# Patient Record
Sex: FEMALE | Race: WHITE | Employment: FULL TIME | URBAN - METROPOLITAN AREA
[De-identification: names, ages, dates, MRNs, and addresses within clinical notes are randomized per-mention and may not be internally consistent; named-entity substitution may affect disease eponyms.]

---

## 2020-09-21 ENCOUNTER — OFFICE VISIT (OUTPATIENT)
Dept: SURGERY | Age: 40
End: 2020-09-21

## 2020-09-21 VITALS
RESPIRATION RATE: 18 BRPM | SYSTOLIC BLOOD PRESSURE: 136 MMHG | HEART RATE: 74 BPM | HEIGHT: 61 IN | WEIGHT: 260 LBS | DIASTOLIC BLOOD PRESSURE: 80 MMHG | TEMPERATURE: 97.7 F | OXYGEN SATURATION: 96 % | BODY MASS INDEX: 49.09 KG/M2

## 2020-09-21 DIAGNOSIS — I10 ESSENTIAL HYPERTENSION: ICD-10-CM

## 2020-09-21 DIAGNOSIS — E66.01 MORBID OBESITY (HCC): Primary | ICD-10-CM

## 2020-09-21 RX ORDER — HYDROCHLOROTHIAZIDE 12.5 MG/1
CAPSULE ORAL
Status: ON HOLD | COMMUNITY
Start: 2020-08-07 | End: 2021-03-22

## 2020-09-21 RX ORDER — LANOLIN ALCOHOL/MO/W.PET/CERES
325 CREAM (GRAM) TOPICAL DAILY
COMMUNITY
Start: 2020-08-07

## 2020-09-21 RX ORDER — VITAMIN A ACETATE, BETA CAROTENE, ASCORBIC ACID, CHOLECALCIFEROL, .ALPHA.-TOCOPHEROL ACETATE, DL-, THIAMINE MONONITRATE, RIBOFLAVIN, NIACINAMIDE, PYRIDOXINE HYDROCHLORIDE, FOLIC ACID, CYANOCOBALAMIN, CALCIUM CARBONATE, FERROUS FUMARATE, ZINC OXIDE, CUPRIC OXIDE 3080; 12; 120; 400; 1; 1.84; 3; 20; 22; 920; 25; 200; 27; 10; 2 [IU]/1; UG/1; MG/1; [IU]/1; MG/1; MG/1; MG/1; MG/1; MG/1; [IU]/1; MG/1; MG/1; MG/1; MG/1; MG/1
1 TABLET, FILM COATED ORAL DAILY
COMMUNITY
End: 2021-03-23

## 2020-09-21 RX ORDER — CALCIUM CARBONATE/VITAMIN D3 600 MG-125
TABLET ORAL
COMMUNITY

## 2020-09-21 RX ORDER — LEVOTHYROXINE SODIUM 112 UG/1
125 TABLET ORAL
COMMUNITY

## 2020-09-21 NOTE — PROGRESS NOTES
Chief Complaint   Patient presents with    Morbid Obesity     confirmed seminar     Pt ID confirmed    Weight Loss Metrics 9/21/2020 9/21/2020   Pre op / Initial Wt 260 -   Today's Wt - 260 lb   BMI - 49.13 kg/m2   Ideal Body Wt 122 -   Excess Body Wt 138 -   Goal Wt 150 -   Wt loss to date 0 -   % Wt Loss 0 -   80% .4 -       Body mass index is 49.13 kg/m².

## 2020-09-21 NOTE — LETTER
9/21/20 Patient: Mary Vasquez YOB: 1980 Date of Visit: 9/21/2020 Ortiz Lovell MD 
[address] [place] [address] [address] [place] [address] VIA Facsimile: [phone] Dear Ortiz Lovell MD, Thank you for referring Ms. Mary Vasquez to [facility] [address] [place] [address] for evaluation. My notes for this consultation are attached. If you have questions, please do not hesitate to call me. I look forward to following your patient along with you.  
 
 
Sincerely, 
 
Marissa Benítez MD

## 2020-09-21 NOTE — PROGRESS NOTES
Initial Consultation for Bariatric Surgery Template (Gastric BYpass)    Miguel Modi is a 38 y.o. female who comes into the office today for initial consultation for the surgical options for the treatment of morbid obesity. The patient initially identified obesity at the age of 23 and at age 24 weighed 120 lbs. She has tried a variety of unsupervised weight-loss attempts including self imposed, registered dietician and phentermine and Keto, but has yet to meet with lasting success. Maximum weight lost on a diet is about 80 lbs, but that the weight loss always seems to return. Today, the patient is  Height: 5' 1\" (154.9 cm) tall, Weight: 117.9 kg (260 lb) lbs for a Body mass index is 49.13 kg/m². It is due to the patient's severe obesity, which is further complicated by hypertension, stress urinary incontinence and obstructive sleep apnea - clinical  that the patient is now seeking out bariatric surgery. Past Medical History:   Diagnosis Date    Diabetes ([facility] [address]     gestational    Hypertension     Thyroid disease        Past Surgical History:   Procedure Laterality Date    HX  SECTION       btl    HX HEENT      tonsillectomy    HX OTHER SURGICAL      facial reconstruction - dog bite    HX TUBAL LIGATION         Current Outpatient Medications on File Prior to Visit   Medication Sig Dispense Refill    ferrous sulfate 325 mg (65 mg iron) tablet Take 325 mg by mouth daily.  hydroCHLOROthiazide (MICROZIDE) 12.5 mg capsule       prenatal vit-calcium-iron-fa (Prenatal Plus, calcium carb,) 27 mg iron- 1 mg tab Take 1 Tab by mouth daily.  calcium-cholecalciferol, d3, (CALCIUM 600 + D) 600-125 mg-unit tab Take  by mouth.  levothyroxine (SYNTHROID) 112 mcg tablet Take  by mouth Daily (before breakfast). No current facility-administered medications on file prior to visit.         No Known Allergies    Social History     Tobacco Use    Smoking status: Former Smoker     Last attempt to quit: 2013     Years since quittin.0    Smokeless tobacco: Never Used   Substance Use Topics    Alcohol use: Yes     Frequency: Monthly or less    Drug use: Not Currently       Family History   Problem Relation Age of Onset    No Known Problems Mother     Alcohol abuse Father     Lung Disease Father     Liver Disease Father        Family Status   Relation Name Status    Mother  Alive    Father  Alive       Review of Systems:  Positive in BOLD    CONST: Fever, weight loss, fatigue or chills  GI: Nausea, vomiting, abdominal pain, change in bowel habits, hematochezia, melena, and GERD   INTEG: Dermatitis, abnormal moles  HEENT: Recent changes in visin, vertigo, epistaxis, dysphagia and hoarseness  CV: Chest pain, palpitations, HTN, edema and varicosities  RESP: Cough, shortness of breath, wheezing, hemoptysis, snoring and reactive airway disease  : Hematuria, dysuria, frequency, urgency, nocturia and stress urinary incontinence   MS: Weakness, joint pain and arthritis  ENDO: Diabetes - gestational, thyroid disease, polyuria, polydipsia, polyphagia, poor wound healing, heat intolerance, cold intolerance  LYMPH/HEME: Anemia, bruising and history of blood transfusions  NEURO: Dizziness, headache, fainting, seizures and stroke  PSYCH: Anxiety and depression      Physical Exam    Visit Vitals  /80   Pulse 74   Temp 97.7 °F (36.5 °C)   Resp 18   Ht 5' 1\" (1.549 m)   Wt 117.9 kg (260 lb)   LMP 2020   SpO2 96%   BMI 49.13 kg/m²       Pre op weight: 260  EBW: 138  Wt loss to date: 0       General: 38 y.o.) female in no acute distress. Morbidly obese in truncal and arms, thighs - mixed pattern  HEENT: Normocephalic, atraumatic, Pupils equal and reactive, nasopharynx clear, oropharynx clear and moist without lesions  NECK: Supple, no lymphadenopathy, thyromegaly, carotid bruits or jugular venous distension.  trachea midline  RESP: Clear to auscultation bilaterally, no wheezes, rhonchi, or rales, normal respiratory excursion  CV: Regular rate and rhythm, no murmurs, rubs or gallops. 3+/4 pulses in bilateral dorsalis pedis and posterior tibialis. No distal edema or varicosities. ABD: Soft, nontender, nondistended, normoactive bowel sounds, no hernias, no hepatosplenomegaly, easily palpable costal margins, mixed distribution, healed fresh pfannenstiel incision  Extremities: Warm, well perfused, no tenderness or swelling, normal gait/station  Neuro: Sensation and strength grossly intact and symmetrical  Psych: Alert and oriented to person, place, and time. Impression:    Noelle Piña is a 38 y.o. female who is suffering from morbid obesity with a BMI of 49  and comorbidities including hypertension and stress urinary incontinence  who would benefit from bariatric surgery. We have had an extensive discussion with regard to the risks, benefits and likely outcomes of the operation. We've discussed the restrictive and malabsorptive nature of the gastric bypass and compared and contrasted with the sleeve gastrectomy. The patient understands the likelihood of losing approximately 80% of their excess weight in 12 to 18 months. She also understands the risks including but not limited to bleeding, infection, need for reoperation, ulcers, leaks and strictures, bowel obstruction secondary to adhesions and internal hernias, DVT, PE, heart attack, stroke, and death. Patient also understands risks of inadequate weight loss, excess weight loss, vitamin insufficiency, protein malnutrition, excess skin, and loss of hair. We have reviewed the components of a successful postoperative course including requirement for a high protein, low carbohydrate diet, 60 oz a day of zero calorie liquids, daily vitamin supplementation, daily exercise, regular follow-up, and participation in support groups.  At this time we will enroll the patient in our bariatric program, undertake routine laboratory evaluation, chest X-ray, EKG, possible UGI and evaluation by  nutritionist as well as psychologist and pending their satisfactory completion of the preop evaluation, plan to perform a gastric bypass.

## 2020-09-23 ENCOUNTER — HOSPITAL ENCOUNTER (OUTPATIENT)
Dept: LAB | Age: 40
Discharge: HOME | End: 2020-09-23
Payer: COMMERCIAL

## 2020-09-23 DIAGNOSIS — I10 ESSENTIAL HYPERTENSION: ICD-10-CM

## 2020-09-23 DIAGNOSIS — E66.01 MORBID OBESITY (HCC): ICD-10-CM

## 2020-09-23 LAB
25(OH)D3 SERPL-MCNC: 30.8 NG/ML (ref 30–100)
ALBUMIN SERPL-MCNC: 3.8 G/DL (ref 3.4–5)
ANION GAP SERPL CALC-SCNC: 7 MMOL/L (ref 3–18)
BASOPHILS # BLD: 0 K/UL (ref 0–0.1)
BASOPHILS NFR BLD: 0 % (ref 0–2)
BUN SERPL-MCNC: 22 MG/DL (ref 7–18)
BUN/CREAT SERPL: 26 (ref 12–20)
CALCIUM SERPL-MCNC: 9.3 MG/DL (ref 8.5–10.1)
CHLORIDE SERPL-SCNC: 105 MMOL/L (ref 100–111)
CO2 SERPL-SCNC: 27 MMOL/L (ref 21–32)
CREAT SERPL-MCNC: 0.85 MG/DL (ref 0.6–1.3)
DIFFERENTIAL METHOD BLD: NORMAL
EOSINOPHIL # BLD: 0.3 K/UL (ref 0–0.4)
EOSINOPHIL NFR BLD: 4 % (ref 0–5)
ERYTHROCYTE [DISTWIDTH] IN BLOOD BY AUTOMATED COUNT: 14 % (ref 11.6–14.5)
FOLATE SERPL-MCNC: 19.9 NG/ML (ref 3.1–17.5)
GLUCOSE SERPL-MCNC: 89 MG/DL (ref 74–99)
HBA1C MFR BLD: 5.1 % (ref 4.2–5.6)
HCT VFR BLD AUTO: 39.9 % (ref 35–45)
HGB BLD-MCNC: 12.8 G/DL (ref 12–16)
IRON SERPL-MCNC: 45 UG/DL (ref 50–175)
LYMPHOCYTES # BLD: 1.5 K/UL (ref 0.9–3.6)
LYMPHOCYTES NFR BLD: 21 % (ref 21–52)
MCH RBC QN AUTO: 29.5 PG (ref 24–34)
MCHC RBC AUTO-ENTMCNC: 32.1 G/DL (ref 31–37)
MCV RBC AUTO: 91.9 FL (ref 74–97)
MONOCYTES # BLD: 0.4 K/UL (ref 0.05–1.2)
MONOCYTES NFR BLD: 5 % (ref 3–10)
NEUTS SEG # BLD: 5.1 K/UL (ref 1.8–8)
NEUTS SEG NFR BLD: 70 % (ref 40–73)
PLATELET # BLD AUTO: 362 K/UL (ref 135–420)
PMV BLD AUTO: 10.8 FL (ref 9.2–11.8)
POTASSIUM SERPL-SCNC: 4.2 MMOL/L (ref 3.5–5.5)
RBC # BLD AUTO: 4.34 M/UL (ref 4.2–5.3)
SODIUM SERPL-SCNC: 139 MMOL/L (ref 136–145)
TSH SERPL DL<=0.05 MIU/L-ACNC: 1.72 UIU/ML (ref 0.36–3.74)
VIT B12 SERPL-MCNC: 469 PG/ML (ref 211–911)
WBC # BLD AUTO: 7.3 K/UL (ref 4.6–13.2)

## 2020-09-23 PROCEDURE — 83540 ASSAY OF IRON: CPT

## 2020-09-23 PROCEDURE — 84443 ASSAY THYROID STIM HORMONE: CPT

## 2020-09-23 PROCEDURE — 86677 HELICOBACTER PYLORI ANTIBODY: CPT

## 2020-09-23 PROCEDURE — 85025 COMPLETE CBC W/AUTO DIFF WBC: CPT

## 2020-09-23 PROCEDURE — 80048 BASIC METABOLIC PNL TOTAL CA: CPT

## 2020-09-23 PROCEDURE — 82607 VITAMIN B-12: CPT

## 2020-09-23 PROCEDURE — 82306 VITAMIN D 25 HYDROXY: CPT

## 2020-09-23 PROCEDURE — 84425 ASSAY OF VITAMIN B-1: CPT

## 2020-09-23 PROCEDURE — 36415 COLL VENOUS BLD VENIPUNCTURE: CPT

## 2020-09-23 PROCEDURE — 93005 ELECTROCARDIOGRAM TRACING: CPT

## 2020-09-23 PROCEDURE — 83036 HEMOGLOBIN GLYCOSYLATED A1C: CPT

## 2020-09-23 PROCEDURE — 82040 ASSAY OF SERUM ALBUMIN: CPT

## 2020-09-24 ENCOUNTER — DOCUMENTATION ONLY (OUTPATIENT)
Dept: BARIATRICS/WEIGHT MGMT | Age: 40
End: 2020-09-24

## 2020-09-24 ENCOUNTER — HOSPITAL ENCOUNTER (OUTPATIENT)
Dept: BARIATRICS/WEIGHT MGMT | Age: 40
Discharge: HOME | End: 2020-09-24

## 2020-09-24 LAB
ATRIAL RATE: 66 BPM
CALCULATED P AXIS, ECG09: 53 DEGREES
CALCULATED R AXIS, ECG10: -12 DEGREES
CALCULATED T AXIS, ECG11: 10 DEGREES
DIAGNOSIS, 93000: NORMAL
H PYLORI IGA SER-ACNC: <9 UNITS (ref 0–8.9)
P-R INTERVAL, ECG05: 146 MS
Q-T INTERVAL, ECG07: 446 MS
QRS DURATION, ECG06: 92 MS
QTC CALCULATION (BEZET), ECG08: 467 MS
VENTRICULAR RATE, ECG03: 66 BPM

## 2020-09-24 NOTE — PROGRESS NOTES
[address] [facility] [address]    Patient's Name: Evelyn Lovelace   Age: 38 y.o. YOB: 1980   Sex: female    Date:   9/24/2020    Insurance:            Session: 1 of 6  Revision:   Surgeon:  Dr. Karrie Hernandez    Height: 5 f 1 Weight:    260      Lbs. BMI:    Pounds Lost since last month: 0               Pounds Gained since last month: 0    Starting Weight: 260   Previous Months Weight: 260  Overall Pounds Lost: 0 Overall Pounds Gained: 0      Do you smoke? None    Alcohol intake:  Number of drinks at a time:  None  Number of times a week: None    Class Guidelines    Guidelines are reviewed with patient at the start of every class. 1. Patient understands that weight loss trial classes must be consecutive. Patient understands if they miss a class, it is their responsibility to contact me to reschedule class. I will reach out to patient after their first no show. 2.  Patient understands the expectations that weight maintenance/weight loss is expected during the classes. Failure to demonstrate changes may result in one extra month of weight loss trial, followed by going back to see the surgeon. 3. Patient is also instructed to be doing their labs, blood work, psych visit, support group and any other test that the surgeon has used while they are working on their weight loss trial.    Other Pertinent Information:     Changes Made Since Last Class: None, first class. Eating Habits and Behaviors      Today we reviewed key diet principles. We talked about ways that patient can follow a low calorie diet. These included: Stopping all liquid calories and patient was given a list of fluid choices that would be appropriate. We talked about carbohydrates.   Patient was educated that all carbohydrates turn to sugar and was encouraged to stick to the complex carbohydrates while in weight loss trials, but aim to keep less than 100 grams during weight loss trials. Patient was given a list of foods to not eat and drink due to high sugar, high fat, or high carbohydrate content. Patient was also given a list of foods and drinks that are high protein, low carbohydrate, and would be appropriate to eat. Patient was given a list of fruit and what a portion size is and how many carbohydrates it contains. Patient was encouraged to keep fruit intake to a minimum. Patient was also given a list of 50 low carbohydrate snack options, but was also cautioned that some of the choices still were high in calories and portion control should be practiced. Patient's current diet habits include: Patient states her last 9 month history is a little different because she had baby August 3. She states she got up to 307 pounds with her child. 3 meals a day and no snacking. She states she was previously eating all day long. Breakfast:  Eggs and ding. Previously was cereal and waffles. LUnch:  She states it was previously pizza rolls, tacos, whatever she could quickly microwave. She states today she had a sandwich (1 slice of bread) and is doing more meal preparation. She states she typically does not eat anything with her meals. Dinner:  Patient states he may have chicken, pork chops, chili, tacos, spaghetti. No longer snacking between meals. She states before it was chips and salsa, guacamole, or popcorn and cookies. Patient states she is not drinking a lot of soda, but does drink a lot of carbonated water. She states she does drink a lot of Crystal Light. She states she started tracking her fluid again. Physical Activity/Exercise    Comments:     Currently for exercise, patient is not getting a lot in, but did have a  just 7 weeks ago. We talked about activities for patient to do, including walking, swimming, or chair exercises.     Behavior Modification       Comments:   Patient was encouraged to food journal. I talked with patient about tracking their daily carbohydrate. One helpful rafal is My Fitness Pal.  Patient was also encouraged to track their daily fluid intake. Discussed with patient the rafal, Water Logged, that can be helpful in tracking their fluid intake. We also talked about the importance of planning ahead. Lack of willpower is often a lack of planning. Goals:  1. Drinking more water.   2. Getting in more exercise        Randell Javier Gonzalo [address] RD  9/24/2020

## 2020-09-25 LAB
H PYLORI IGM SER-ACNC: 12.9 UNITS (ref 0–8.9)
VIT B1 BLD-SCNC: 117.3 NMOL/L (ref 66.5–200)

## 2020-09-30 ENCOUNTER — TELEPHONE (OUTPATIENT)
Dept: SURGERY | Age: 40
End: 2020-09-30

## 2020-09-30 RX ORDER — LANOLIN ALCOHOL/MO/W.PET/CERES
325 CREAM (GRAM) TOPICAL
Qty: 90 TAB | Refills: 1 | Status: SHIPPED | OUTPATIENT
Start: 2020-09-30 | End: 2021-05-12 | Stop reason: ALTCHOICE

## 2020-09-30 RX ORDER — CLARITHROMYCIN 500 MG/1
500 TABLET, FILM COATED ORAL 2 TIMES DAILY
Qty: 28 TAB | Refills: 0 | Status: SHIPPED | OUTPATIENT
Start: 2020-09-30 | End: 2020-10-14

## 2020-09-30 RX ORDER — OMEPRAZOLE 20 MG/1
20 CAPSULE, DELAYED RELEASE ORAL 2 TIMES DAILY
Qty: 28 CAP | Refills: 0 | Status: SHIPPED | OUTPATIENT
Start: 2020-09-30 | End: 2020-10-14

## 2020-09-30 RX ORDER — AMOXICILLIN 500 MG/1
500 CAPSULE ORAL 3 TIMES DAILY
Qty: 42 CAP | Refills: 0 | Status: SHIPPED | OUTPATIENT
Start: 2020-09-30 | End: 2020-10-14

## 2020-09-30 NOTE — PROGRESS NOTES
Iron low: start iron 65mg with VIt C or Bariatric advantage iron   H Pylori +: start hpylori tx, rx sent to pharm on file     Please review with patient, also sent to MyChart

## 2020-09-30 NOTE — TELEPHONE ENCOUNTER
Pt to start treatment for h-pylori and iron see rx writer will take vit c 500 milligram 30 minutes before each dose pt states an understanding

## 2020-10-29 ENCOUNTER — DOCUMENTATION ONLY (OUTPATIENT)
Dept: BARIATRICS/WEIGHT MGMT | Age: 40
End: 2020-10-29

## 2020-10-29 ENCOUNTER — HOSPITAL ENCOUNTER (OUTPATIENT)
Dept: BARIATRICS/WEIGHT MGMT | Age: 40
Discharge: HOME | End: 2020-10-29

## 2020-10-29 NOTE — PROGRESS NOTES
[address] [facility] [address] [facility], [address]    Patient's Name: Tirso Richardson   Age: 38 y.o. YOB: 1980   Sex: female      Insurance:              Session: 2 of  6  Surgeon:  Dr. Gabbi Henderson    Height: 5 f 1 Weight:    250      Lbs. BMI:    Pounds Lost since last month: 10               Pounds Gained since last month: 0    Starting Weight: 260   Previous Months Weight: 260  Overall Pounds Lost: 10 Overall Pounds Gained: 0      Do you smoke? None    Alcohol intake:  Number of drinks at a time:  None  Number of times a week: None    Class Guidelines    Guidelines are reviewed with patient at the start of every class. 1. Patient understands that weight loss trial classes must be consecutive. Patient understands if they miss a class, it is their responsibility to contact me to reschedule class. I will reach out to patient after their first no show. 2.  Patient understands the expectations that weight maintenance/weight loss is expected during the classes. Failure to demonstrate changes may result in one extra month of weight loss trial, followed by going back to see the surgeon. 3. Patient is also instructed to be doing their labs, blood work, psych visit, support group and any other test that the surgeon has used while they are working on their weight loss trial.  4. Patient is instructed to bring their education binder to all classes. Changes Made Since Last Class: No diet changes. Eating Habits and Behaviors      Today we reviewed key diet principles. We talked about patient following a low calorie/low carbohydrate diet while they are in weight loss trials. To achieve this, patient is encouraged to avoid liquid calories, including alcohol, soda, sweet tea, and fruit juices. Patient can cut carbohydrates by trying to stick to meat and vegetables.   Patient can also eat eggs, cheese, and good fat, while trying to eliminate starches, such as pasta, rice, crackers, chips, popcorn. I also gave a power point that included 21 Ways to Stay on Track with a Healthy Lifestyle. Some of the food-related suggestions included drinking plenty of water or calorie-free beverages prior to their meal.  Patient is encouraged to to fill up on protein first, which is the ultimate fill-me up food. We talked about the importance of eating breakfast and the effects that can happen if one skips meals, which includes eating larger portions, snacking more, and decreasing their metabolism. With the suggestions in the power point, patient will be able to decrease their calories and carbohydrate intake. Patient's current diet habits include: Patient states she is eating 3 meals per day. Patient states she has been tracking her food and keeping her carbohydrates under 70 grams per day. She states she has eliminated most snacks and is just eating her 3 meals. She states that snacking is a weakness for her and she has stopped this. She states she is struggling to get her fluid intake in. Physical Activity/Exercise    Comments: We talked about the importance of establishing a work out routine. Patient is currently doing some walking for activity. She states she walked 3 miles the other day. She is working a knee injury    Behavior Modification       Comments:   Some of the behavior tips that were included in the power point, include being choosy about night time snacking. Patient was encouraged to make the TV a no eating zone and not eat after 7 pm.  Patient is also encouraged to keep a food journal.        One of the other things we talked about during class is whether or not patient has a support system. Patient indicated that their family is supportive of the surgery and they have some that will be there for them after surgery.   I also reminded all patients to make their psychologist appointment and attend at least 1 support group. Goals set by Registered Dietitian:  1. Drink more water  2.  More meal preparation       Kareen Robbins MS RD  October 23, 2020

## 2020-11-22 ENCOUNTER — DOCUMENTATION ONLY (OUTPATIENT)
Dept: BARIATRICS/WEIGHT MGMT | Age: 40
End: 2020-11-22

## 2020-11-22 NOTE — PROGRESS NOTES
[address] [facility] [place] [facility] [address] [facility], [address]    Patient's Name: Fabiana Coles   Age: 38 y.o. YOB: 1980   Sex: female      Insurance:            Session: 3 of  6  Revision:   Surgeon:  Dr. Marla Simmonds    Height: 5 f 1 Weight:    247      Lbs. BMI:    Pounds Lost since last month: 3               Pounds Gained since last month: 0    Starting Weight: 260   Previous Months Weight: 250  Overall Pounds Lost: 13 Overall Pounds Gained: 0      Do you smoke? None    Alcohol intake:  Number of drinks at a time:  None  Number of times a week: None    Class Guidelines    Guidelines are reviewed with patient at the start of every class. 1. Patient understands that weight loss trial classes must be consecutive. Patient understands if they miss a class, it is their responsibility to contact me to reschedule class. I will reach out to patient after their first no show. 2.  Patient understands the expectations that weight maintenance/weight loss is expected during the classes. Failure to demonstrate changes may result in one extra month of weight loss trial, followed by going back to see the surgeon. 3. Patient is also instructed to be doing their labs, blood work, psych visit, support group and any other test that the surgeon has used while they are working on their weight loss trial.    Other Pertinent Information:     Changes Made Since Last Class: More water      Dietary Instruction    During today's class we continued to focus on the key diet principles. Patient was instructed to follow a low carbohydrate diet, focusing on meat and vegetables. Patient was instructed to stop liquid calories and aim for 64 ounces of water per day. In class, I also gave patient a power point on surviving the holidays.   Some of the tips included survival tips for parties, including bringing their own low carbohydrate dish to a potluck dinner and surveying the buffet line before they start filling up their plate. Patient was given cooking alternatives, including using Splenda for sugar, substituting applesauce for oil in recipes, and using low fat plain yogurt instead of sour cream in dips. Patient was also encouraged to be mindful of calories in alcohol. Patient's diet habits include: 3 meals per day. She states she has been tracking her carbohydrates and is between  grams per day. She states she is snacking on Mozarella sticks, sugar free jello, guacamole, celery and peanut butter, or a [place] salad. She is drinking 32-64 ounces of water and Crystal Light. Physical Activity/Exercise    Patient is currently doing minimal for activity. Today's power point on surviving the holidays also included tips on exercising. This included being creative during the holiday, walking stairs, mall walking, getting resistance bands. Patient was encouraged not to be afraid to excuse themselves from the table to go for a walk after they eat. Comments: Goals were set. Behavior Modification    Reinforced behavior changes to make. Patient was encouraged to keep their emotions in check. Try to HALT and focus on whether they are eating out of hunger or if they are eating out of emotions. Other eating behaviors included surveying the buffet line before starting to fill up their plate. Patient was given a check off list and encouraged to monitor some of their eating behaviors, such as eating slowly, chewing their food thoroughly, and taking 20-30 minutes to eat a meal.    Goals that patient set for next month include:  1. More water  2. More activity.        Randell Beard Gonzalo 87 RD  11/22/2020

## 2020-11-23 ENCOUNTER — OFFICE VISIT (OUTPATIENT)
Dept: SURGERY | Age: 40
End: 2020-11-23
Payer: COMMERCIAL

## 2020-11-23 ENCOUNTER — HOSPITAL ENCOUNTER (OUTPATIENT)
Dept: BARIATRICS/WEIGHT MGMT | Age: 40
Discharge: HOME | End: 2020-11-23

## 2020-11-23 VITALS
SYSTOLIC BLOOD PRESSURE: 114 MMHG | HEIGHT: 61 IN | BODY MASS INDEX: 47.5 KG/M2 | RESPIRATION RATE: 18 BRPM | OXYGEN SATURATION: 97 % | WEIGHT: 251.6 LBS | HEART RATE: 70 BPM | DIASTOLIC BLOOD PRESSURE: 70 MMHG | TEMPERATURE: 97.8 F

## 2020-11-23 DIAGNOSIS — R63.5 WEIGHT GAIN: ICD-10-CM

## 2020-11-23 DIAGNOSIS — I10 ESSENTIAL HYPERTENSION: ICD-10-CM

## 2020-11-23 DIAGNOSIS — Z90.3 HISTORY OF SLEEVE GASTRECTOMY: ICD-10-CM

## 2020-11-23 DIAGNOSIS — E66.01 MORBID OBESITY (HCC): Primary | ICD-10-CM

## 2020-11-23 PROCEDURE — 99213 OFFICE O/P EST LOW 20 MIN: CPT | Performed by: NURSE PRACTITIONER

## 2020-11-23 NOTE — PROGRESS NOTES
Gideon Cooper is a 38 y.o. female  Chief Complaint   Patient presents with    Morbid Obesity     Mid Trial     Pt ID confirmed    Weight Loss Metrics 11/23/2020 11/23/2020 9/21/2020 9/21/2020   Pre op / Initial Wt 251.6 - 260 -   Today's Wt - 251 lb 9.6 oz - 260 lb   BMI - 47.54 kg/m2 - 49.13 kg/m2   Ideal Body Wt 122 - 122 -   Excess Body Wt 129.6 - 138 -   Goal Wt 148 - 150 -   Wt loss to date 0 - 0 -   % Wt Loss 0 - 0 -   80% .68 - 110.4 -       Body mass index is 47.54 kg/m².

## 2020-11-23 NOTE — Clinical Note
Do you mind tracking down her clearance form from Dr. Jose Cadet office. She states it was faxed over but we do not have it. Dr. Jose Cadet office number [phone]. Please contact the patient after so she's updated about clearances. Thanks [place]!

## 2020-11-23 NOTE — PROGRESS NOTES
Bariatric Preoperative Progress Note          Subjective:     Bekah Jordan is a 38 y.o. female who presents today for followup of their candidacy for bariatric surgery. Since last seen, Bekah Jordan has been working through bariatric program towards [facility]. Past Medical History:   Diagnosis Date    Diabetes ([address]     gestational    Hypertension     Thyroid disease        Past Surgical History:   Procedure Laterality Date    HX  SECTION       btl    HX HEENT      tonsillectomy    HX OTHER SURGICAL      facial reconstruction - dog bite    HX TUBAL LIGATION         Current Outpatient Medications   Medication Sig Dispense Refill    ferrous sulfate 325 mg (65 mg iron) tablet Take 325 mg by mouth daily.  prenatal vit-calcium-iron-fa (Prenatal Plus, calcium carb,) 27 mg iron- 1 mg tab Take 1 Tab by mouth daily.  calcium-cholecalciferol, d3, (CALCIUM 600 + D) 600-125 mg-unit tab Take  by mouth.  levothyroxine (SYNTHROID) 112 mcg tablet Take  by mouth Daily (before breakfast).  ferrous sulfate 325 mg (65 mg iron) tablet Take 1 Tab by mouth Daily (before breakfast). 90 Tab 1    hydroCHLOROthiazide (MICROZIDE) 12.5 mg capsule          No Known Allergies            Objective:       Physical Exam    Visit Vitals  /70   Pulse 70   Temp 97.8 °F (36.6 °C) (Temporal)   Resp 18   Ht 5' 1\" (1.549 m)   Wt 114.1 kg (251 lb 9.6 oz)   LMP 2020   SpO2 97%   BMI 47.54 kg/m²         General: AAOX3, pleasant and cooperative to exam. Appropriately groomed. NAD. Non-toxic in appearance. Appears stated age. Chest: Good equal bilateral expansion  Lungs: Clear to auscultation bilaterally without e/o crackles, wheezes or rhales. Heart: RRR, S1 and S2 noted. No c/r/m/g/vpmi. Abdomen: obese, soft and non-tender without distension. Good bowel sounds. No vis/palp masses or pulsations. No organo-splenomegaly. No hernias to my exam. No e/o acute abdomen or peritoneal signs. Extremities: Positive pulses in all 4 extremities. Baseline range of motion in all 4 extremities. Strength, sensation and reflexes intact, appropriate and equal in b/l upper and lower extremities. No C/C/E  Neuro: CN II-XII grossly intact without focal deficit. Ambulatory. Skin: Clean, warm and dry. Other pertinent observable physical exam findings:-      Studies to date:    Labs: significant for iron deficiency and +H. Pylori. She is now taking iron supplements and has been treated for H. Pylori     EKG:   Normal sinus rhythm with sinus arrhythmia   Normal ECG   No previous ECGs available   Confirmed by Tami Weiner M.D., [address] [place] [address] ([id number]) on 9/24/2020 8:54:15 AM     Nutritional evaluation: 3 of 6 complete     Psychiatric evaluation: States she had visit with Dr. Horacio Alexandra Oct 26, 2020. Need documents from Dr. Jovan Ta. Pt states she she scheduled to see Dr. Jovan Ta again in January for follow up    Support Group: Attended     Additional evaluations:Sleep study: complete-using Cpap, need clearance documents. Pt states documents were sent from Dr. Dmitri Hernandez office a month ago. PCP clearance: Cleared        Assessment:   Cayden Riggs is a 38 y.o. female who is progressing through the bariatric preoperative evaluation. At this time, they are yet an appropriate candidate for weight loss surgery. Ms. Laura Luna has a reminder for a \"due or due soon\" health maintenance. I have asked that she contact her primary care provider for follow-up on this health maintenance.       Plan:   -complete remainder of preop evaluation including 3 nutrition visits, clearance forms from Dr. Horacio Alexandra office and Dr. Dmitri Hernandez office.   -Patient voices understanding that weight gain during weight loss trial may result in cancellation of weight loss surgery.   -Follow up once has completed entirety of weight loss workup to determine next steps.    -Anticipate WLT completion mid February      We discussed the expected course, resolution and complications of the diagnosis(es) in detail. Medication risks, benefits, costs, interactions, and alternatives were discussed as indicated. I advised her to contact the office if her condition worsens, changes or fails to improve as anticipated. She expressed understanding with the diagnosis(es) and plan.           Phillip Herron, NP

## 2020-12-22 ENCOUNTER — HOSPITAL ENCOUNTER (OUTPATIENT)
Dept: BARIATRICS/WEIGHT MGMT | Age: 40
Discharge: HOME | End: 2020-12-22

## 2020-12-22 ENCOUNTER — DOCUMENTATION ONLY (OUTPATIENT)
Dept: BARIATRICS/WEIGHT MGMT | Age: 40
End: 2020-12-22

## 2020-12-22 NOTE — PROGRESS NOTES
[address] [place] Loss Amara LATANYA Chuck [address] [facility], [address]    Patient's Name: Mahnaz Aguilar   Age: 38 y.o. YOB: 1980   Sex: female    Date:   12/22/2020          Session: 4 of  6   Surgeon:  Dr. James Izaguirre    Height: 5 f 1 Weight:    243      Lbs. BMI:    Pounds Lost since last month: 4               Pounds Gained since last month: 0    Starting Weight: 260   Previous Months Weight: 247  Overall Pounds Lost: 17 Overall Pounds Gained: 0      Do you smoke? None    Alcohol intake:  Number of drinks at a time:  None  Number of times a week: None    Class Guidelines    Guidelines are reviewed with patient at the start of every class. 1. Patient understands that weight loss trial classes must be consecutive. Patient understands if they miss a class, it is their responsibility to contact me to reschedule class. I will reach out to patient after their first no show. 2.  Patient understands the expectations that weight maintenance/weight loss is expected during the classes. Failure to demonstrate changes may result in one extra month of weight loss trial, followed by going back to see the surgeon. 3. Patient is also instructed to be doing their labs, blood work, psych visit, support group and any other test that the surgeon has used while they are working on their weight loss trial.    Other Pertinent Information:     Changes Made Since Last Class: Drinking a lot of protein shakes    Eating Habits and Behaviors      Today we reviewed key diet principles. We talked about protein drinks and ones that would be okay. Patient was encouraged to start getting into a routine now and drinking a shake. Patient may use for a meal replacement or a snack. Patient was also encouraged to stop liquid calories. We talked about fluid choices that would be okay. We also spent a lot of time talking about carbohydrates.   Patient was encouraged to start cutting their carbohydrates out and keep them less than 100 grams per day. Patient was given examples of carbohydrates that are in food. We also talked about the power of protein and the importance of getting more protein in per day than carbohydrates. I also reviewed with patient the vitamins that they will need to take post op. Patient will hear this information again at pre op class prior to surgery, but I felt it was important to prepare them now. Patient will be taking 2 multivitamin complete per day, 100 mg of Vitamin B1, 5000 IU of Vitamin D3, 1000 mcg Vitamin B12, 1500 mg of calcium citrate. We also spent some time talking about the post op diet protocol. Patient is aware they will be on a liquid diet before surgery and then a week of liquids after surgery followed by 5 weeks of soft protein. Patient's current diet habits include: Patient is eating 3 meals per day. Patient is eating bread, rice pasta, cereal few times a week. She states she is definitely keeping her carbohydrates under 75 grams per day. Patient states they are eating cookies, candy, ice cream, or other sweets 1 times a week. Patient is encouraged to focus on protein and try to keep this less than 75 grams per day. Patient is snacking on cheese sticks, yogurt, or cottage cheese. Patient is eating out 3 x a month. Patient is eating fast food 0 times a week. Patient is drinking 24-64 ounces of water. Patient is drinking 0 ounces of soda, sweet tea, or other liquid calories. Physical Activity/Exercise    Comments:     Currently for exercise, patient is sedentary. States she has been working overtime. We talked about activities for patient to do, including walking, swimming, or chair exercises. Goals have been set. Behavior Modification       Comments:  Emphasized the importance of eating slowly, not eating and drinking meals at the same time.   At the end of today's weight loss trial class, we opened it up for a discussion. This gave patients an opportunity to ask questions or concerns they may have about post op protocol. Goals that patient set for next month include:  1. Increase fluid  2. Increase activity.      Randell Perry Gonzalo [address] RD  12/22/2020

## 2021-01-28 ENCOUNTER — DOCUMENTATION ONLY (OUTPATIENT)
Dept: BARIATRICS/WEIGHT MGMT | Age: 41
End: 2021-01-28

## 2021-01-28 ENCOUNTER — HOSPITAL ENCOUNTER (OUTPATIENT)
Dept: BARIATRICS/WEIGHT MGMT | Age: 41
Discharge: HOME | End: 2021-01-28

## 2021-01-28 NOTE — PROGRESS NOTES
[address] Loss Aamra LATANYA Chuck [address] [facility], [address]    Patient's Name: Ana Cristina Key   Age: 38 y.o. YOB: 1980   Sex: female    Date:   1/28/2021        Session: 5 of 6  Revision:     Surgeon:  Dr. Ki Fernandez    Height: 5 f1   Weight:    244      Lbs. BMI: 46.1   Pounds Lost since last month: 0                 Pounds Gained since last month: 1    Starting Weight: 260     Previous Months Weight: 243  Overall Pounds Lost: 16   Overall Pounds Gained: 0    Do you smoke? None    Alcohol intake:  Number of drinks at a time:  None  Number of times a week: None    Class Guidelines    Guidelines are reviewed with patient at the start of every class. 1. Patient understands that weight loss trial classes must be consecutive. Patient understands if they miss a class, it is their responsibility to contact me to reschedule class. I will reach out to patient after their first no show. 2.  Patient understands the expectations that weight maintenance/weight loss is expected during the classes. Failure to demonstrate changes may result in one extra month of weight loss trial, followed by going back to see the surgeon. 3. Patient is also instructed to be doing their labs, blood work, psych visit, support group and any other test that the surgeon has used while they are working on their weight loss trial.    Other Pertinent Information:     Changes Made Since Last Class: None    Eating Habits and Behaviors      Today we reviewed key diet principles. We talked about snack ideas that would focus more on protein. We also talked about the benefits of filling up on protein first and keeping the daily carbohydrate intake to less than 75 grams per day. Patient was instructed to increase fluid intake to 64 ounces per day and stop all carbonation, caffeine, and sugary drinks.   During class, we talked about the importance of getting on a routine of eating 3 meals a day, eating within one hour of waking up, and not going longer than 4 hours without fueling the body again. I also talked with patient about some meal ideas. Patient's current diet habits include: 3 meals per day. Patient is doing a Premier shake, eggs, or ding for breakfast.  Lunch varies and dinner is spaghetti, hamburger, chili, chicken. She is snacking on Mozarella sticks and yogurt. She is not eating sweets. She is not drinking liquid calories, but is drinking 8-24 ounces of water per day and is encouraged to keep drinking to get up to 64 ounces. Physical Activity/Exercise    Comments:     Currently for exercise, patient is doing a lot of stretching, cardio, rowing machine, and boxing bag. We talked about activities for patient to do, including walking, swimming, or chair exercises. Behavior Modification       Comments:   During class, I reviewed a power point with patients called, \"Assessing Your Readiness to Change. \"  During this power point, patient was asked to self-evaluate themselves. At the end, we tallied the scores to determine how ready they are to make changes for the surgery. For the New Year's, I had patient set New Year's resolutions, including a food-related goal, exercise-related goal, and behavior goal.  Patient was encouraged to track the goals on a daily basis using the check off list I provided. Goals should be SMART, specific, measurable, attainable, realistic, and time-orientated. Patient's Goals are:  1. Behavior-Related Goal: Get 6 hours of sleep at least 5 days a week   2. Food-related goal: Increase water intake  3. Exercise-related goal: 30 minutes on the rowing machine without stopping.       Randell Rousseau Gonzalo 87 RD  1/28/2021

## 2021-02-25 ENCOUNTER — HOSPITAL ENCOUNTER (OUTPATIENT)
Dept: BARIATRICS/WEIGHT MGMT | Age: 41
Discharge: HOME | End: 2021-02-25

## 2021-02-26 ENCOUNTER — DOCUMENTATION ONLY (OUTPATIENT)
Dept: BARIATRICS/WEIGHT MGMT | Age: 41
End: 2021-02-26

## 2021-02-26 NOTE — PROGRESS NOTES
[address] Loss Amara Woods [address], [address]    Patient's Name: Ida Keith   Age: 38 y.o. YOB: 1980   Sex: female    Date:   2/26/2021              Session: 6 of 6  Revision:     Surgeon:  Dr. Maria Isabel Vanegas    Height: 5 f 1   Weight:    241      Lbs. BMI: 45.3   Pounds Lost since last month: 3                 Pounds Gained since last month: 0    Starting Weight: 260     Previous Months Weight: 244  Overall Pounds Lost: 19   Overall Pounds Gained: 0    Do you smoke? None    Alcohol intake:  Number of drinks at a time:  None  Number of times a week: None    Class Guidelines    Guidelines are reviewed with patient at the start of every class. 1. Patient understands that weight loss trial classes must be consecutive. Patient understands if they miss a class, it is their responsibility to contact me to reschedule class. I will reach out to patient after their first no show. 2.  Patient understands the expectations that weight maintenance/weight loss is expected during the classes. Failure to demonstrate changes may result in one extra month of weight loss trial, followed by going back to see the surgeon. 3. Patient is also instructed to be doing their labs, blood work, psych visit, support group and any other test that the surgeon has used while they are working on their weight loss trial.   Patient understands that they CAN NOT gain any weight during the weight loss trial.  Gaining weight will result in extra classes    Other Pertinent Information:     Changes Made Since Last Class: Better portion control    Eating Habits and Behaviors      Today we started off class talking about the Key Diet Principles. Patient was encouraged to start drinking 64 ounces of fluid per day. Patient was encouraged to start cutting out soda, caffeine, carbonation, sweet tea, fruit juice, and fruit smoothies.  Patient was also instructed to fill up on meat, fish, vegetables, eggs, cheese, and some fruit. We also talked about protein drinks and patient was encouraged to start trying these, using them either for a meal replacement or a substitute for a current meal, which may be higher in carbohydrates. We talked about ways to lower carbohydrates and start trying substitutes, such as zucchini noodles and cauliflower rice. Patient's current diet habits include: 3 meals per day. Patient is eating eggs, ding, cottage cheese, pineapples, blueberries for breakfast.  Lunch is shrimp, tuna, or leftovers. Dinner is string cheese, broccoli, some type of protein. He is snacking on string cheese or yogurt. She is eating sweets 1 x a week. She is drinking 20 ounces of fluid and is working on increasing this. Physical Activity/Exercise    Comments:     Currently for exercise, patient is doing some stretches and kick boxing on the weekends. We talked about activities for patient to do, including walking, swimming, or chair exercises. I also talked with patient about doing some strength training, which helps the metabolism, as well. Goals have been set. Behavior Modification       Comments:   I also gave a power point on Behavior Changes and Weight Loss. Some of the suggestions in the power point included food journaling. Patient was also given some strategies to follow, such as cooking just enough for the meal and not putting serving bowls on the table. Patient was also encouraged to restrict where they are eating to 1-2 locations to avoid mindless eating throughout the day. Patient was given a check off list and encouraged to set 3 goals for the month. One goal that patient has set for next month is:  1. Working on increasing fluid intake.       Randell Gibbs 87 RD  2/26/2021

## 2021-02-26 NOTE — PROGRESS NOTES
Nutrition Evaluation    Patient's Name: Odalys Dang   Age: 38 y.o. YOB: 1980   Sex: female    Height: 5 f 1 Weight: 241 BMI:    Starting Weight:  260        Smoking Status:  None  Alcohol Intake:  Number of Drinks at a Time: None  Number of Times a Week: None    Changes made during classes include:  Cut out sweets  Exercising more  Lower carbohydrates       Summary:  I feel that Odalys Dang has demonstrated appropriate diet changes and is ready to move forward with surgery. Patient has been briefed on the importance of the protein drinks, vitamins, and the transition of the diet stages. Patient understands that the long-term diet will focus on protein and vegetables. Patient understand the effects of carbohydrates after surgery and what reactive hypoglycemia is. Patient is aware that they will be attending pre-op class 2 weeks before surgery and will get more detailed information on the post-op diet guidelines. Patient will see me again at 6 weeks post-op. At this 6 week visit, RD will assess how patient is tolerating soft protein and advance to vegetables, if tolerating soft protein without difficulty. Patient will also see RD again at 9 months post-op. This visit will assess patient's compliance with current protocol, including diet, vitamins, protein shakes, and exercise. Post-op diet guidelines will be reinforced. RD is available for questions and to meet with patient outside of the 6 week and 9 month post-op visit. We spent a lot of time talking about the vitamins. Patient understands the importance of being compliant with the diet protocol and the complications and risks that can occur if they are non-compliant with the nutritional protocol. Patient has attended at least one support group.     Candidate for surgery: Yes  Re-evaluation Date:     Procedure:  Gastric Bypass     Randell Gibbs 87 RD  2/26/2021

## 2021-03-04 ENCOUNTER — OFFICE VISIT (OUTPATIENT)
Dept: SURGERY | Age: 41
End: 2021-03-04
Payer: COMMERCIAL

## 2021-03-04 VITALS
TEMPERATURE: 97.2 F | WEIGHT: 247 LBS | HEIGHT: 61 IN | DIASTOLIC BLOOD PRESSURE: 80 MMHG | SYSTOLIC BLOOD PRESSURE: 146 MMHG | RESPIRATION RATE: 20 BRPM | BODY MASS INDEX: 46.63 KG/M2 | HEART RATE: 92 BPM

## 2021-03-04 DIAGNOSIS — E66.01 MORBID OBESITY (HCC): Primary | ICD-10-CM

## 2021-03-04 DIAGNOSIS — I10 ESSENTIAL HYPERTENSION: ICD-10-CM

## 2021-03-04 DIAGNOSIS — Z90.3 HISTORY OF SLEEVE GASTRECTOMY: ICD-10-CM

## 2021-03-04 PROCEDURE — 99215 OFFICE O/P EST HI 40 MIN: CPT | Performed by: SURGERY

## 2021-03-04 RX ORDER — LANOLIN ALCOHOL/MO/W.PET/CERES
CREAM (GRAM) TOPICAL
COMMUNITY
Start: 2021-02-08

## 2021-03-04 RX ORDER — BUPROPION HYDROCHLORIDE 300 MG/1
TABLET ORAL
COMMUNITY
Start: 2021-03-04

## 2021-03-04 RX ORDER — PHENTERMINE HYDROCHLORIDE 37.5 MG/1
CAPSULE ORAL
COMMUNITY
Start: 2021-02-15 | End: 2021-03-23

## 2021-03-04 RX ORDER — TOPIRAMATE 50 MG/1
TABLET, FILM COATED ORAL 2 TIMES DAILY
COMMUNITY
End: 2021-03-23

## 2021-03-04 NOTE — PROGRESS NOTES
Preop History and Physical Exam:    Maira Olmedo is a 38 y.o. female who comes into the office today after completing the entirety of the bariatric preoperative protocol satisfactorily. she initially identified obesity at the age of 23 and at age 24 weighed 180lbs. she has tried a variety of unsupervised weight-loss attempts, but has yet to meet with lasting success. Today, the patient is Height: 5' 1\" (154.9 cm) tall, Weight: 112 kg (247 lb) lbs for a Body mass index is 46.67 kg/m². It is due to their severe obesity, which is further complicated by hypertension, stress urinary incontinence that the patient is now seeking out bariatric surgery, specifically, the gastric bypass      Past Medical History:   Diagnosis Date    Diabetes ([facility] [address]     gestational    Hypertension     Thyroid disease        Past Surgical History:   Procedure Laterality Date    HX  SECTION          HX OTHER SURGICAL      facial reconstruction - dog bite    HX TONSILLECTOMY      tonsillectomy    HX TUBAL LIGATION         Current Outpatient Medications on File Prior to Visit   Medication Sig Dispense Refill    buPROPion XL (WELLBUTRIN XL) 300 mg XL tablet       cyanocobalamin 1,000 mcg tablet TAKE 1 TABLET BY MOUTH EVERY DAY      phentermine 37.5 mg capsule TAKE 1 CAPSULE BY MOUTH EVERY DAY BEFORE BREAKFAST      prenatal 35/CLTV fum/folic/dha (PRENATAL-1 PO) Take  by mouth.  topiramate (TOPAMAX) 50 mg tablet Take  by mouth two (2) times a day.  ferrous sulfate 325 mg (65 mg iron) tablet Take 325 mg by mouth daily.  prenatal vit-calcium-iron-fa (Prenatal Plus, calcium carb,) 27 mg iron- 1 mg tab Take 1 Tab by mouth daily.  levothyroxine (SYNTHROID) 112 mcg tablet Take 125 mcg by mouth Daily (before breakfast).  ferrous sulfate 325 mg (65 mg iron) tablet Take 1 Tab by mouth Daily (before breakfast).  90 Tab 1    hydroCHLOROthiazide (MICROZIDE) 12.5 mg capsule       calcium-cholecalciferol, d3, (CALCIUM 600 + D) 600-125 mg-unit tab Take  by mouth. No current facility-administered medications on file prior to visit. No Known Allergies    Social History     Tobacco Use    Smoking status: Former Smoker     Quit date: 10/21/2013     Years since quittin.3    Smokeless tobacco: Never Used   Substance Use Topics    Alcohol use: Not Currently     Frequency: Monthly or less    Drug use: Not Currently       Family History   Problem Relation Age of Onset    No Known Problems Mother     Alcohol abuse Father     Lung Disease Father     Liver Disease Father        Family Status   Relation Name Status    Mother  Alive    Father  Alive       Review of Systems:  Positive in BOLD     CONST: Fever, weight loss, fatigue or chills  GI: Nausea, vomiting, abdominal pain, change in bowel habits, hematochezia, melena, and GERD   INTEG: Dermatitis, abnormal moles  HEENT: Recent changes in visin, vertigo, epistaxis, dysphagia and hoarseness  CV: Chest pain, palpitations, HTN, edema - improved  and varicosities  RESP: Cough, shortness of breath, wheezing, hemoptysis, snoring and reactive airway disease  : Hematuria, dysuria, frequency, urgency, nocturia and stress urinary incontinence   MS: Weakness, joint pain and arthritis  ENDO: Diabetes - gestational, thyroid disease, polyuria, polydipsia, polyphagia, poor wound healing, heat intolerance, cold intolerance  LYMPH/HEME: Anemia, bruising and history of blood transfusions  NEURO: Dizziness, headache, fainting, seizures and stroke  PSYCH: Anxiety and depression    Physical Exam    Visit Vitals  BP (!) 146/80 (BP 1 Location: Right arm, BP Patient Position: At rest, BP Cuff Size: Adult)   Pulse 92   Temp 97.2 °F (36.2 °C) (Oral)   Resp 20   Ht 5' 1\" (1.549 m)   Wt 112 kg (247 lb)   LMP 2020   BMI 46.67 kg/m²       General: 38 y.o.) female in no acute distress.  Morbidly obese in truncal and arms, thighs - mixed pattern  HEENT: Normocephalic, atraumatic, Pupils equal and reactive, nasopharynx clear, oropharynx clear and moist without lesions  NECK: Supple, no lymphadenopathy, thyromegaly, carotid bruits or jugular venous distension. trachea midline  RESP: Clear to auscultation bilaterally, no wheezes, rhonchi, or rales, normal respiratory excursion  CV: Regular rate and rhythm, no murmurs, rubs or gallops. 3+/4 pulses in bilateral dorsalis pedis and posterior tibialis. No distal edema or varicosities. ABD: Soft, nontender, nondistended, normoactive bowel sounds, no hernias, no hepatosplenomegaly, easily palpable costal margins, mixed distribution, healed fresh pfannenstiel incision  Extremities: Warm, well perfused, no tenderness or swelling, normal gait/station  Neuro: Sensation and strength grossly intact and symmetrical  Psych: Alert and oriented to person, place, and time. Studies:    Labs: significant for iron deficiency and +H. Pylori. She is now taking iron supplements and has been treated for H. Pylori      EKG:   Normal sinus rhythm with sinus arrhythmia   Normal ECG   No previous ECGs available   Confirmed by Juliana French M.D., [address] [place] [address] ([id number]) on 9/24/2020 8:54:15 AM      Nutritional evaluation:  complete       Psychiatric evaluation: approved     Support Group: Attended      Sleep study:  - on CPAP - cleared    PCP clearance: Cleared    Impression:    Matteo Solomon is a 38 y.o. female who is suffering from morbid obesity and comorbidities including hypertension and obstructive sleep apnea - clinical who would benefit from bariatric surgery. We've discussed the restrictive and malabsorptive nature of the gastric bypass and compared and contrasted with the sleeve gastrectomy. The patient understands the likelihood of losing approximately 80% of their excess weight in 12 to 18 months.  She also understands the risks including but not limited to bleeding, infection, need for reoperation, anastomotic ulcers, leaks and strictures, bowel obstruction secondary to adhesions and internal hernias, DVT, PE, heart attack, stroke, and death. The patient is aware that if a hiatal hernia is found and needs to be repaired, it will be with attendant risk of additional bleeding, injury to the diaphragm and recurrence of the hernia. If the liver were abnormal, a biopsy may be performed which also may lead to bleeding as well as bile leak. The patient also understands risks of inadequate weight loss, excess weight loss, vitamin insufficiency, protein malnutrition, excess skin, and loss of hair. We have reviewed the components of a successful postoperative course including requirement for a high protein, low carbohydrate diet, 60 oz a day of zero calorie liquids, daily vitamin supplementation, daily exercise, regular follow-up, and participation in support groups. We have reviewed the preoperative liver shrinking clear liquid diet, as well as reviewed any medication changes required while on the clear liquid diet. In addition, the patient understands that all medications to be taken during the first 8 weeks postoperatively can be taken whole as long as the medication is approximately the size of a Madai 325 mg aspirin tablet in size. The patient further understands that it is his/her responsibility to review these and verify with their primary care doctor and pharmacist that all medications are of the appropriate size. We will schedule the patient for laparoscopic gastric bypass in the near future.

## 2021-03-04 NOTE — H&P (VIEW-ONLY)
Preop History and Physical Exam: 
 
Brandie Mcclelland is a 38 y.o. female who comes into the office today after completing the entirety of the bariatric preoperative protocol satisfactorily. she initially identified obesity at the age of 23 and at age 24 weighed 180lbs. she has tried a variety of unsupervised weight-loss attempts, but has yet to meet with lasting success. Today, the patient is Height: 5' 1\" (154.9 cm) tall, Weight: 112 kg (247 lb) lbs for a Body mass index is 46.67 kg/m². It is due to their severe obesity, which is further complicated by hypertension, stress urinary incontinence that the patient is now seeking out bariatric surgery, specifically, the gastric bypass Past Medical History:  
Diagnosis Date  Diabetes ([facility] [address] gestational  
 Hypertension  Thyroid disease Past Surgical History:  
Procedure Laterality Date  HX  SECTION    
   HX OTHER SURGICAL    
 facial reconstruction - dog bite  HX TONSILLECTOMY    
 tonsillectomy  HX TUBAL LIGATION Current Outpatient Medications on File Prior to Visit Medication Sig Dispense Refill  buPROPion XL (WELLBUTRIN XL) 300 mg XL tablet  cyanocobalamin 1,000 mcg tablet TAKE 1 TABLET BY MOUTH EVERY DAY  phentermine 37.5 mg capsule TAKE 1 CAPSULE BY MOUTH EVERY DAY BEFORE BREAKFAST  prenatal 21/ZIUP fum/folic/dha (PRENATAL-1 PO) Take  by mouth.  topiramate (TOPAMAX) 50 mg tablet Take  by mouth two (2) times a day.  ferrous sulfate 325 mg (65 mg iron) tablet Take 325 mg by mouth daily.  prenatal vit-calcium-iron-fa (Prenatal Plus, calcium carb,) 27 mg iron- 1 mg tab Take 1 Tab by mouth daily.  levothyroxine (SYNTHROID) 112 mcg tablet Take 125 mcg by mouth Daily (before breakfast).  ferrous sulfate 325 mg (65 mg iron) tablet Take 1 Tab by mouth Daily (before breakfast). 90 Tab 1  
 hydroCHLOROthiazide (MICROZIDE) 12.5 mg capsule  calcium-cholecalciferol, d3, (CALCIUM 600 + D) 600-125 mg-unit tab Take  by mouth. No current facility-administered medications on file prior to visit. No Known Allergies Social History Tobacco Use  Smoking status: Former Smoker Quit date: 10/21/2013 Years since quittin.3  Smokeless tobacco: Never Used Substance Use Topics  Alcohol use: Not Currently Frequency: Monthly or less  Drug use: Not Currently Family History Problem Relation Age of Onset  No Known Problems Mother  Alcohol abuse Father  Lung Disease Father  Liver Disease Father Family Status Relation Name Status  Mother  Alive  Father  Alive Review of Systems:  Positive in BOLD 
  
CONST: Fever, weight loss, fatigue or chills GI: Nausea, vomiting, abdominal pain, change in bowel habits, hematochezia, melena, and GERD INTEG: Dermatitis, abnormal moles HEENT: Recent changes in visin, vertigo, epistaxis, dysphagia and hoarseness CV: Chest pain, palpitations, HTN, edema - improved  and varicosities RESP: Cough, shortness of breath, wheezing, hemoptysis, snoring and reactive airway disease : Hematuria, dysuria, frequency, urgency, nocturia and stress urinary incontinence MS: Weakness, joint pain and arthritis ENDO: Diabetes - gestational, thyroid disease, polyuria, polydipsia, polyphagia, poor wound healing, heat intolerance, cold intolerance LYMPH/HEME: Anemia, bruising and history of blood transfusions NEURO: Dizziness, headache, fainting, seizures and stroke PSYCH: Anxiety and depression Physical Exam 
 
Visit Vitals BP (!) 146/80 (BP 1 Location: Right arm, BP Patient Position: At rest, BP Cuff Size: Adult) Pulse 92 Temp 97.2 °F (36.2 °C) (Oral) Resp 20 Ht 5' 1\" (1.549 m) Wt 112 kg (247 lb) LMP 2020 BMI 46.67 kg/m² General: 38 y.o.) female in no acute distress. Morbidly obese in truncal and arms, thighs - mixed pattern HEENT: Normocephalic, atraumatic, Pupils equal and reactive, nasopharynx clear, oropharynx clear and moist without lesions NECK: Supple, no lymphadenopathy, thyromegaly, carotid bruits or jugular venous distension. trachea midline RESP: Clear to auscultation bilaterally, no wheezes, rhonchi, or rales, normal respiratory excursion CV: Regular rate and rhythm, no murmurs, rubs or gallops. 3+/4 pulses in bilateral dorsalis pedis and posterior tibialis. No distal edema or varicosities. ABD: Soft, nontender, nondistended, normoactive bowel sounds, no hernias, no hepatosplenomegaly, easily palpable costal margins, mixed distribution, healed fresh pfannenstiel incision Extremities: Warm, well perfused, no tenderness or swelling, normal gait/station Neuro: Sensation and strength grossly intact and symmetrical 
Psych: Alert and oriented to person, place, and time. Studies: 
 
Labs: significant for iron deficiency and +H. Pylori. She is now taking iron supplements and has been treated for H. Pylori  
  
EKG:  
Normal sinus rhythm with sinus arrhythmia Normal ECG No previous ECGs available Confirmed by Gretel Lerma M.D., [address] [place] [address] ([id number]) on 9/24/2020 8:54:15 AM  
  
Nutritional evaluation:  complete   
  
Psychiatric evaluation: approved 
  
Support Group: Attended  
  
Sleep study:  - on CPAP - cleared PCP clearance: Cleared Impression: 
 
Joel Mensah is a 38 y.o. female who is suffering from morbid obesity and comorbidities including hypertension and obstructive sleep apnea - clinical who would benefit from bariatric surgery. We've discussed the restrictive and malabsorptive nature of the gastric bypass and compared and contrasted with the sleeve gastrectomy. The patient understands the likelihood of losing approximately 80% of their excess weight in 12 to 18 months.  She also understands the risks including but not limited to bleeding, infection, need for reoperation, anastomotic ulcers, leaks and strictures, bowel obstruction secondary to adhesions and internal hernias, DVT, PE, heart attack, stroke, and death. The patient is aware that if a hiatal hernia is found and needs to be repaired, it will be with attendant risk of additional bleeding, injury to the diaphragm and recurrence of the hernia. If the liver were abnormal, a biopsy may be performed which also may lead to bleeding as well as bile leak. The patient also understands risks of inadequate weight loss, excess weight loss, vitamin insufficiency, protein malnutrition, excess skin, and loss of hair. We have reviewed the components of a successful postoperative course including requirement for a high protein, low carbohydrate diet, 60 oz a day of zero calorie liquids, daily vitamin supplementation, daily exercise, regular follow-up, and participation in support groups. We have reviewed the preoperative liver shrinking clear liquid diet, as well as reviewed any medication changes required while on the clear liquid diet. In addition, the patient understands that all medications to be taken during the first 8 weeks postoperatively can be taken whole as long as the medication is approximately the size of a Madai 325 mg aspirin tablet in size. The patient further understands that it is his/her responsibility to review these and verify with their primary care doctor and pharmacist that all medications are of the appropriate size. We will schedule the patient for laparoscopic gastric bypass in the near future.

## 2021-03-04 NOTE — LETTER
3/4/2021 Patient: Fanny Patterson YOB: 1980 Date of Visit: 3/4/2021 Lars Murphy MD 
[address] [employer] [facility] [address] [place] [address] Via Fax: [phone] Dear Lars Murphy MD, Thank you for referring Ms. Fanny Patterson to [facility] for evaluation. My notes for this consultation are attached. If you have questions, please do not hesitate to call me. I look forward to following your patient along with you.  
 
 
Sincerely, 
 
Doug Ramirez MD

## 2021-03-09 ENCOUNTER — DOCUMENTATION ONLY (OUTPATIENT)
Dept: BARIATRICS/WEIGHT MGMT | Age: 41
End: 2021-03-09

## 2021-03-09 NOTE — PROGRESS NOTES
3/9/21:  Patient was due to attend pre op class today. She called right before class, very tearful, and stated that her PCP is sending her to the ER. She states it started off as a bruise, but he wanted to have it checked out to see if it was cellulitis or a blood clot. Patient did not attend class. She has since e-mailed me and stated it was not a blood clot and stated her PCP is putting her on an antibiotic for 10 days. Surgery is scheduled for March 22.   She has been put in the pre op class on March 16.    Joyce Pineda MS RD

## 2021-03-16 ENCOUNTER — HOSPITAL ENCOUNTER (OUTPATIENT)
Dept: BARIATRICS/WEIGHT MGMT | Age: 41
Discharge: HOME | End: 2021-03-16

## 2021-03-16 ENCOUNTER — TELEPHONE (OUTPATIENT)
Dept: MEDSURG UNIT | Age: 41
End: 2021-03-16

## 2021-03-16 ENCOUNTER — DOCUMENTATION ONLY (OUTPATIENT)
Dept: BARIATRICS/WEIGHT MGMT | Age: 41
End: 2021-03-16

## 2021-03-16 NOTE — PROGRESS NOTES
CLINICAL NUTRITION PRE-OPERATIVE EDUCATION    Patient's Name: Ade Martel   Age: 38 y.o. YOB: 1980   Sex: female    Education & Materials Provided:   - Soft Protein Diet Shopping List  -  Supplemental Resource Guide: MVI, B12, Calcium Citrate, Vitamin D, Vitamin B1,   and iron recommendations  - Protein Supplement Information  - Fluid Requirements/ No Straws  - No Caffeine or Carbonation   - No alcohol              - No Snacks or No Concentrated Sweets     - Exercising   - Support System at [facility] of Support Group meetings. Support System after surgery includes: x     - Key Diet Principles            - Addressed Current Habits/Changes to Make   - Patient has been educated on the liquid diet to begin 1 week prior to surgery. Patient understands the transition of the diet. Attendance of support group: Yes    Summary:  Patient has completed the required amount of visits with the Registered Dietitian. During these nutrition visits, we focused on dietary changes, behavior changes, and the importance of establishing an exercise routine. The diet protocol that patient was prescribed emphasized on low carbohydrates (less than 100 grams per day prior to surgery and 60-80 grams of protein per day). At today's session, patient was educated on the post-op diet protocol. Patient understands the importance of keeping total fat and sugar less than 3 grams per serving. Patient is aware of the transition of the diet stages and is aware that they will be on clear liquids for 7days, followed by soft protein for 5 weeks. Patient understands the body needs ~ 60-70 grams of protein per day. During the liquid phase, patient will need 60 grams of protein from shakes. Once eating soft protein, patient will only need 1 shake per day. Patient is aware of the importance of the vitamins and protein drinks. We spent a lot of time talking about the vitamins.   Patient understands the importance of being compliant with the diet protocol and the complications and risks that can occur if they are non-compliant with the nutritional protocol and non-compliant with the vitamins. Patient has also been educated on the pre-op liquid diet for 1 week. Patient understands that failure to comply in pre-op liquid diet could result in surgery being canceled. Patient's 6 week post-op nutrition appointment has been scheduled. At this 6 week visit, RD will assess how patient is tolerating soft protein and advance to vegetables, if tolerating soft protein without difficulty. Patient will also see RD again at 9 months post-op. This visit will assess patient's compliance with current protocol, including diet, vitamins, protein shakes, and exercise. Post-op diet guidelines will be reinforced. RD is available for questions and to meet with patient outside of the 6 week and 9 month post-op visit. Ok to proceed.      Candidate for surgery: Yes  Re-evaluation Date:     Randell Delgado Gonzalo [address] RD  3/16/2021

## 2021-03-17 ENCOUNTER — HOSPITAL ENCOUNTER (OUTPATIENT)
Dept: PREADMISSION TESTING | Age: 41
Discharge: HOME | End: 2021-03-17
Payer: COMMERCIAL

## 2021-03-17 DIAGNOSIS — Z90.3 HISTORY OF SLEEVE GASTRECTOMY: ICD-10-CM

## 2021-03-17 DIAGNOSIS — E66.01 MORBID OBESITY (HCC): ICD-10-CM

## 2021-03-17 DIAGNOSIS — I10 ESSENTIAL HYPERTENSION: ICD-10-CM

## 2021-03-17 LAB
ANION GAP SERPL CALC-SCNC: 8 MMOL/L (ref 3–18)
BUN SERPL-MCNC: 16 MG/DL (ref 7–18)
BUN/CREAT SERPL: 20 (ref 12–20)
CALCIUM SERPL-MCNC: 8.9 MG/DL (ref 8.5–10.1)
CHLORIDE SERPL-SCNC: 108 MMOL/L (ref 100–111)
CO2 SERPL-SCNC: 25 MMOL/L (ref 21–32)
CREAT SERPL-MCNC: 0.82 MG/DL (ref 0.6–1.3)
GLUCOSE SERPL-MCNC: 91 MG/DL (ref 74–99)
POTASSIUM SERPL-SCNC: 4.2 MMOL/L (ref 3.5–5.5)
SODIUM SERPL-SCNC: 141 MMOL/L (ref 136–145)

## 2021-03-17 PROCEDURE — 36415 COLL VENOUS BLD VENIPUNCTURE: CPT

## 2021-03-17 PROCEDURE — U0005 INFEC AGEN DETEC AMPLI PROBE: HCPCS

## 2021-03-17 PROCEDURE — 80048 BASIC METABOLIC PNL TOTAL CA: CPT

## 2021-03-18 LAB — SARS-COV-2, COV2NT: NOT DETECTED

## 2021-03-19 RX ORDER — ENOXAPARIN SODIUM 100 MG/ML
40 INJECTION SUBCUTANEOUS DAILY
Qty: 7 SYRINGE | Refills: 0 | Status: SHIPPED | OUTPATIENT
Start: 2021-03-19 | End: 2021-03-26

## 2021-03-19 NOTE — TELEPHONE ENCOUNTER
Gastric Bypass Instruct patient to read and understand how their surgery works. The laparoscopic Phyllis-en-Y Gastric Bypass -- often called gastric  bypass -- is considered the gold standard of weight loss surgery. The procedure: The gastric bypass is one of the most frequently performed weight  loss procedures in the [place]. In this procedure, stapling  creates a small (15 to 20 milliliters) stomach pouch. The remainder  of the stomach is not removed but is completely stapled shut and divided from the stomach  pouch. The outlet from this newly formed pouch empties directly into the lower portion of the  small intestine, thus bypassing calorie absorption. This is done by dividing the small intestine just  beyond the duodenum for the purpose of bringing it up and constructing a connection with the  newly formed stomach pouch. The other end is connected into the side of the Phyllis limb of the  intestine creating the Y shape that gives the technique its name. The length of either segment of  the intestine can be increased to produce lower or higher levels of malabsorption. Most importantly, the rerouting of the food stream produces changes in gut hormones that  promote feeling of fullness, suppress hunger, and reverse one of the primary mechanisms by which  obesity induces Type 2 diabetes. Advantages:   The average excess weight loss after the gastric bypass procedure is generally higher in a  compliant patient than with purely restrictive procedures.  One year after surgery, weight loss can average 60 to 80 percent of excess body weight.  Studies show that after 10 to 14 years, 50 to 60 percent of excess body weight loss has been  maintained by some patients.  A 2000 study of 500 patients showed that 96 percent of associated health conditions (back  pain, sleep apnea, high blood pressure, diabetes and depression) were improved or resolved.   Disadvantages:   Because the duodenum is bypassed, poor absorption of iron and calcium can result in the  lowering of total body iron and a predisposition to iron deficiency anemia.  A chronic anemia caused by vitamin B-12 deficiency may occur. This problem usually can be  prevented with vitamin B-12 pills under the tongue or injections.  In some cases, the effectiveness of the procedure may be reduced if the stomach pouch is  stretched and/or if it is initially left larger than 15 to 30 cc.  The bypassed portion of the stomach, duodenum and segments of the small intestine cannot  be easily visualized using X-ray or endoscopy if there are any problems, such as ulcers,  bleeding or malignancy. Sleeve Gastrectomy  The laparoscopic sleeve gastrectomy -- often called the  sleeve -- is performed by removing approximately 80 percent  of the stomach. The remaining stomach is a tubular pouch that  resembles a banana. The procedure:  During the sleeve gastrectomy procedure, a thin, vertical sleeve  of stomach is created by using a stapling device. The sleeve is about the size of a banana. The rest  of the stomach is removed. By creating a smaller stomach pouch, a sleeve gastrectomy limits the  amount of food that can be eaten at one time so you feel full sooner and stay full longer. As you  eat less food, your body will stop storing excess calories and start using its fat supply for energy. The greater impact, however, seems to be the effect the surgery has on gut hormones that impact  a number of factors including hunger, feeling of fullness, and blood sugar control. Advantages:   Eliminates the portion of the stomach that produces the hormones that stimulate hunger.  Minimizes the chance of an ulcer occurring.  Is an appealing option for people who are concerned about the complications of intestinal  bypass procedures or who have anemia, Crohns disease or various other conditions that make  intestinal bypass procedures too risky.   -- 13 --  PATIENT GUIDE TO Juliette De La Cruz  Disadvantages:   Potential for inadequate weight loss or weight regain. While this is true for all procedures, it is  more possible with procedures that do not have an intestinal bypass.  Higher BMI patients may need to have a second-stage procedure later to help lose additional  weight. Remember, two stages may ultimately be safer and more effective than one operation  for higher BMI patients.  This procedure does involve stomach stapling and therefore, leaks and other complications  related to stapling may occur.  This procedure is not reversible. Pre op Appoitments  PE LOCATION PROVIDER  2 Weeks  6-Week Nutrition  3 Months*  6 Months*  1 Year*  Patient Acknowledgement of Risks, Benefits,  and Alternatives to Bariatric Surgical Procedures  Patient Name:_________________________________________________________________  :_ _______________________________________________________________________  I am requesting bariatric surgery be performed on me. I believe I may benefit from bariatric  surgery. This form is designed to ensure that I understand the risks, benefits, and alternatives to  having bariatric surgery. Bariatric surgery, or surgery for morbid obesity, is major surgery. The  options available include restrictive procedures, or those that limit digestive capacity. Examples  include vertical sleeve gastrectomy, or the adjustable gastric band (Lap-Band¢ç/Realize). Malabsorptive procedures, such as distal gastric bypass produce weight loss by decreasing nutrient  absorption. Biliopancreatic diversion with duodenal switch (BPD/DS) and Phyllis-en-Y gastric  bypass combine these two processes to varying degrees. While most procedures can be performed  laparoscopically (through multiple small incisions), there is a possibility that an open technique  may be required (through a large incision).  There are alternatives to surgery including medications,  diet and exercise, and behavior modification. I understand that obesity is a chronic disease with no  known cure. I am choosing bariatric surgery as treatment for this disease. Patient initials: ______________  I am informed of the potential benefits from bariatric surgery which may include improvements  in associated comorbidities (ie; diabetes, obstructive sleep apnea, GERD, high blood pressure),  potential weight loss of 50-80 percent excess weight, and general improvement in quality of life. The benefits are not guaranteed, and are dependent upon me making the necessary lifestyle  changes for success. I am aware that some patients may not lose as much weight, or still may  require treatment for medical problems after bariatric surgery. Some patients may gain back some  or all of the weight lost after bariatric surgery. Bariatric surgery is a treatment tool, not a cure for  obesity. Compliance with the dietary and lifestyle recommendations is necessary for maintenance  of lost weight in the long term. For example, I have been taught that it is recommended that  all patients maintain a healthy diet consisting of low-carbohydrate, low-sugar foods rich in lean  protein, and non-starchy vegetables. Regular exercise including aerobic activity and weight  training is encouraged, as well as regular attendance at support group meetings. Patient initials: ______________  -- 23 --  PATIENT GUIDE TO Juliette De La Cruz  Eliminating habits that could be detrimental to my health such as drinking alcohol or smoking  is required for all patients. I am aware that the risks of smoking and alcohol use after bariatric  surgery include anesthesia complications, stomach ulcers, liver diseases and malnutrition.   In addition, research has shown an increase in sensitivity to alcohol particularly after gastric bypass  procedures resulting in rapid increases in blood alcohol levels and possible addiction. Patient initials: ______________  Because bariatric surgery is considered major surgery, there are many potential complications that  could arise. Some of the problems are related to the bariatric procedure itself, while others are  related to anesthesia and operating on the abdomen. I understand the serious potential complications include: deep venous thrombosis/pulmonary  embolism (blood clots in the legs and or lungs); gastrointestinal leak (leakage of digestive contents  into the abdomen); sepsis (serious infection); injury to adjacent organs such as esophagus, spleen,  pancreas, liver, diaphragm (requiring intervention or surgical removal); excessive bleeding; bowel  obstruction (blockage of the intestines); or organ failure. I am informed that these complications  can be life threatening. The overall mortality rate (risk of death) from bariatric surgery is close to  0.1 percent (1/1,000), but can be as high as 0.5 percent (1/200) for some patients. Patient initials: ______________  I understand that complications requiring re-operation may occur, either immediately after initial  surgery, or later in the recovery process. Patient initials: ______________  Other potential complications which I may have include: wound infections or seromas  (fluid collection under skin); hernias (breakdown of tissue holding in abdominal contents);  gastritis or stomach ulcer (inflammation of the stomach); formation of gallstones; formation  of kidney stones or urinary tract infection; or pneumonia. Device related complications such as  foreign body reaction, band slippage, or erosion may occur with the adjustable gastric band  (Lap-Band¢ç/Realize). Patient initials: ______________  -- 21 --  PATIENT GUIDE TO Juliette De La Cruz  I may struggle with food intolerances after bariatric surgery. These may include sugars, fats, and  lactose (milk sugar).  My taste for certain foods may change as well. Dumping Syndrome (reaction  caused by sugar rapidly entering the intestine -- symptoms include: nausea, sweating, weakness,  dizziness, flushing, possible vomiting and/or diarrhea) occurs often after bariatric surgery.  Vomiting and changes in bowel habits may occur, and may be the result of eating too fast, taking  too large a bite, inappropriate food choice or not chewing properly. Chronic vomiting may be a  result of stenosis (tightening) in the stomach pouch and intestine, and may require that I have  treatment with endoscopy or surgery. Malabsorption may lead to diarrhea, foul smelling gas and  protein malnutrition. Though rarely, I may require feedings through tubes into the digestive tract  or veins for nourishment. I am aware that in some patients hair loss or thinning may occur in the  rapid weight loss phase. This is usually temporary, but can be permanent in some cases. I have  been taught about the importance of proper hydration after bariatric surgery, and understand that  dehydration is the most common reason for re-admission to the hospital after surgery.  Patient initials: ______________  I understand that over time, and especially with forced overeating, the stomach pouch may stretch  (dilate) or the staple lines may break. This can result in weight regain, ulcer formation or both.  Patient initials: ______________  As a female undergoing bariatric surgery, I acknowledge that I must prevent pregnancies for at  least 12 to 18 months following surgery. Pregnancy during the rapid weight loss phase can be  dangerous and harmful to both the mother and fetus.  Patient initials: ______________  Vitamin and mineral deficiencies can occur after bariatric surgery. I am instructed to take vitamin  and mineral supplements daily for life (including multivitamin, iron, B vitamins, calcium and vitamin  D). Failure to comply with these recommendations could result in my experiencing  weakness,  nerve or brain damage, confusion, fatigue, rashes, anemia, hair loss, bone loss, and mood changes. I agree to have lab work at regular intervals to assess for vitamin deficiencies. I understand that it  is imperative that I receive continued follow up care after my bariatric surgery by my surgeon, my  program, or other clinician experienced in bariatric care. Patient initials: ______________  -- 21 --  PATIENT GUIDE TO Juliette De La Cruz  If I have an adjustable gastric band (Lap-Band¢ç/Realize), needle adjustments (addition/removal  of saline solution) are required for weight loss and to maintain weight loss. Patient initials: ______________  After I lose weight, the skin of my arms, legs, abdomen, neck, and face may become wrinkled,  droop or sag. Rashes and infections may occur in between my skin folds. Cosmetic surgery may  be indicated in some cases. This is not considered medically necessary in most cases and is rarely  covered by insurance. Patient initials: ______________  I understand that psychological changes may occur with weight loss as a result of bariatric surgery,  which can affect relationships with my loved ones. I agree to re-engage with a mental health  provider as needed. Patient initials: ______________  Some patients elect to have revisional bariatric surgery (correcting anatomic defects from a  previous bariatric operation). I am aware that in these cases, all of the previously addressed risks  apply, however they are three to five times more common. Patient initials: ______________  Follow-up appointments are vital. I agree to the following schedule of appointments after surgery:  two to three weeks, three months, six months, 12 months, and then annually for life. Additional  appointments may be necessary. Gastric Band patient follow-up is determined by my need for  adjustments but is at least once per year after the first year.   Patient initials: ______________  -- 25 --  PATIENT GUIDE  Patient Acknowledgement of Risks, Benefits,  and Alternatives to Bariatric Surgical Procedures  Patient Name:_________________________________________________________________  :_ _______________________________________________________________________  I am requesting bariatric surgery be performed on me. I believe I may benefit from bariatric  surgery. This form is designed to ensure that I understand the risks, benefits, and alternatives to  having bariatric surgery. Bariatric surgery, or surgery for morbid obesity, is major surgery. The  options available include restrictive procedures, or those that limit digestive capacity. Examples  include vertical sleeve gastrectomy, or the adjustable gastric band (Lap-Band¢ç/Realize). Malabsorptive procedures, such as distal gastric bypass produce weight loss by decreasing nutrient  absorption. Biliopancreatic diversion with duodenal switch (BPD/DS) and Phyllis-en-Y gastric  bypass combine these two processes to varying degrees. While most procedures can be performed  laparoscopically (through multiple small incisions), there is a possibility that an open technique  may be required (through a large incision). There are alternatives to surgery including medications,  diet and exercise, and behavior modification. I understand that obesity is a chronic disease with no  known cure. I am choosing bariatric surgery as treatment for this disease. Patient initials: ______________  I am informed of the potential benefits from bariatric surgery which may include improvements  in associated comorbidities (ie; diabetes, obstructive sleep apnea, GERD, high blood pressure),  potential weight loss of 50-80 percent excess weight, and general improvement in quality of life. The benefits are not guaranteed, and are dependent upon me making the necessary lifestyle  changes for success.  I am aware that some patients may not lose as much weight, or still may  require treatment for medical problems after bariatric surgery. Some patients may gain back some  or all of the weight lost after bariatric surgery. Bariatric surgery is a treatment tool, not a cure for  obesity. Compliance with the dietary and lifestyle recommendations is necessary for maintenance  of lost weight in the long term. For example, I have been taught that it is recommended that  all patients maintain a healthy diet consisting of low-carbohydrate, low-sugar foods rich in lean  protein, and non-starchy vegetables. Regular exercise including aerobic activity and weight  training is encouraged, as well as regular attendance at support group meetings. Patient initials: ______________  -- 23 --  PATIENT GUIDE TO Juliette De La Cruz  Eliminating habits that could be detrimental to my health such as drinking alcohol or smoking  is required for all patients. I am aware that the risks of smoking and alcohol use after bariatric  surgery include anesthesia complications, stomach ulcers, liver diseases and malnutrition. In addition, research has shown an increase in sensitivity to alcohol particularly after gastric bypass  procedures resulting in rapid increases in blood alcohol levels and possible addiction. Patient initials: ______________  Because bariatric surgery is considered major surgery, there are many potential complications that  could arise. Some of the problems are related to the bariatric procedure itself, while others are  related to anesthesia and operating on the abdomen.   I understand the serious potential complications include: deep venous thrombosis/pulmonary  embolism (blood clots in the legs and or lungs); gastrointestinal leak (leakage of digestive contents  into the abdomen); sepsis (serious infection); injury to adjacent organs such as esophagus, spleen,  pancreas, liver, diaphragm (requiring intervention or surgical removal); excessive bleeding; bowel  obstruction (blockage of the intestines); or organ failure. I am informed that these complications  can be life threatening. The overall mortality rate (risk of death) from bariatric surgery is close to  0.1 percent (1/1,000), but can be as high as 0.5 percent (1/200) for some patients. Patient initials: ______________  I understand that complications requiring re-operation may occur, either immediately after initial  surgery, or later in the recovery process. Patient initials: ______________  Other potential complications which I may have include: wound infections or seromas  (fluid collection under skin); hernias (breakdown of tissue holding in abdominal contents);  gastritis or stomach ulcer (inflammation of the stomach); formation of gallstones; formation  of kidney stones or urinary tract infection; or pneumonia. Device related complications such as  foreign body reaction, band slippage, or erosion may occur with the adjustable gastric band  (Lap-Band¢ç/Realize). Patient initials: ______________  -- 21 --  PATIENT GUIDE TO Juliette De La Cruz  I may struggle with food intolerances after bariatric surgery. These may include sugars, fats, and  lactose (milk sugar). My taste for certain foods may change as well. Dumping Syndrome (reaction  caused by sugar rapidly entering the intestine -- symptoms include: nausea, sweating, weakness,  dizziness, flushing, possible vomiting and/or diarrhea) occurs often after bariatric surgery. Vomiting and changes in bowel habits may occur, and may be the result of eating too fast, taking  too large a bite, inappropriate food choice or not chewing properly. Chronic vomiting may be a  result of stenosis (tightening) in the stomach pouch and intestine, and may require that I have  treatment with endoscopy or surgery. Malabsorption may lead to diarrhea, foul smelling gas and  protein malnutrition.  Though rarely, I may require feedings through tubes into the digestive tract  or veins for nourishment. I am aware that in some patients hair loss or thinning may occur in the  rapid weight loss phase. This is usually temporary, but can be permanent in some cases. I have  been taught about the importance of proper hydration after bariatric surgery, and understand that  dehydration is the most common reason for re-admission to the hospital after surgery. Patient initials: ______________  I understand that over time, and especially with forced overeating, the stomach pouch may stretch  (dilate) or the staple lines may break. This can result in weight regain, ulcer formation or both. Patient initials: ______________  As a female undergoing bariatric surgery, I acknowledge that I must prevent pregnancies for at  least 12 to 18 months following surgery. Pregnancy during the rapid weight loss phase can be  dangerous and harmful to both the mother and fetus. Patient initials: ______________  Vitamin and mineral deficiencies can occur after bariatric surgery. I am instructed to take vitamin  and mineral supplements daily for life (including multivitamin, iron, B vitamins, calcium and vitamin  D). Failure to comply with these recommendations could result in my experiencing weakness,  nerve or brain damage, confusion, fatigue, rashes, anemia, hair loss, bone loss, and mood changes. I agree to have lab work at regular intervals to assess for vitamin deficiencies. I understand that it  is imperative that I receive continued follow up care after my bariatric surgery by my surgeon, my  program, or other clinician experienced in bariatric care. If I have an adjustable gastric band (Lap-Band¢ç/Realize), needle adjustments (addition/removal  of saline solution) are required for weight loss and to maintain weight loss.   Patient initials: ______________  After I lose weight, the skin of my arms, legs, abdomen, neck, and face may become wrinkled,  droop or sag. Rashes and infections may occur in between my skin folds. Cosmetic surgery may  be indicated in some cases. This is not considered medically necessary in most cases and is rarely  covered by insurance. Patient initials: ______________  I understand that psychological changes may occur with weight loss as a result of bariatric surgery,  which can affect relationships with my loved ones. I agree to re-engage with a mental health  provider as needed. Patient initials: ______________  Some patients elect to have revisional bariatric surgery (correcting anatomic defects from a  previous bariatric operation). I am aware that in these cases, all of the previously addressed risks  apply, however they are three to five times more common. Patient initials: ______________  Follow-up appointments are vital. I agree to the following schedule of appointments after surgery:  two to three weeks, three months, six months, 12 months, and then annually for life. Additional  appointments may be necessary. Gastric Band patient follow-up is determined by my need for  adjustments but is at least once per year after the first year  Diet Quick Review  7-Day Preoperative Liquid Diet  Benefits:   Reducing intake before surgery will shrink  your liver by depleting glycogen (a form of  stored energy).  Reduced liver size gives better access to  stomach during surgery, which translates  to a safer surgery.  Prevents the last supper syndrome.  Experiencing weight loss before the  procedure encourages postoperative  compliance and jump-starts weight loss. Specifics:   Start seven days before surgery:  ____________________.  NO SOLID FOODS!!   Your surgery will be CANCELED if this  diet is not followed!!!   Minimum of 64 ounces of fluid daily,  including protein drinks.  No added sugar or carbonated beverages.    Continue to take all your prescribed  medication and your vitamin supplements  during this preoperative diet phase. Clear liquids:   Water.  Sugar free, non-carbonated beverages  (crystal light, propel).  Sugar free popsicles.  Sugar free Jell-O.   Fat free, reduced sodium broth .  Decaffeinated coffee or decaffeinated tea  with artificial sweeteners. Protein:   60 grams of protein daily  (in liquid supplements).  Pre-made protein drinks.  Protein powder added to water.  3 gram rule -- limit sugar and fat to less  than 3 grams per 8 ounces.  4 to 6 ounces of low fat/low sugar yogurt  OR cottage cheese three to four times  during the week. [facility] Gastric Bypass and Sleeve Dietary Progression Quick Review    Date of Surgery: _     __________Clear liquid diet.  Begin bariatric clear liquid diet on: ______________________________________________   64 ounces of fluid per day.  Low calorie, low sugar, non-carbonated beverages:  -- Water, Crystal Light, Propel Water, Sugar Free Jell-O, Sugar Free Popsicles, bouillon. -- Start protein supplement during this stage (60 to 70 grams per day). -- Start all vitamin supplements during this stage (see pages 57-58). -- Getting your fluid in and staying hydrated is your number one priority!  The clear liquid diet will last for seven days. ____________________________________________________    t.  Begin bariatric soft and moist diet on: _____________________________________________  [facility] This stage of the diet will last for five weeks, unless otherwise instructed by your surgeon.  Begin -- one week after surgery.  End -- six weeks after surgery (or when you follow up with the registered dietitian).  Soft, moist, high protein foods -- three meals per day plus protein supplements. -- Portions should emphasize on soft protein. -- Portions will be a MAXIMUM of 1 ounce of solid food and 2 to 3 ounces of cottage cheese and yogurt.   -- Protein supplements should be between meals and provide 30 to 40 grams per day during  soft protein diet.  -- Continue to get 64 ounces of fluid in per day.  Protein foods that are OK (SLOW TRANSITION) on the soft and moist diet:  -- First week on soft protein should focus on yogurt, cottage cheese, eggs, VEGETARIAN refried  beans, black beans, kidney beans and white beans. (NO BAKED BEANS.)  -- Second through fourth weeks should focus on yogurt, cottage cheese, eggs, canned tuna,  canned chicken, tilapia and fish (needs to be soft enough to be cut up with a fork). -- Fifth week on soft protein diet should focus on yogurt, cottage cheese, eggs, canned tuna,  canned chicken, tilapia, fish, salmon, chicken breast or turkey. Remember to continue to get 64 ounces of fluid daily on ALL stages. To be advanced to bariatric maintenance stage of the bariatric diet, follow up with the dietitian  six weeks after surgery, around:   ___________________________________________________  After having a sleeve gastrectomy I will not be able to take NSAIDs  (non-steroidal anti-inflammatory drugs) for _________ weeks. 15. After having a gastric bypass I will not be able to take NSAIDs  (non-steroidal anti-inflammatory drugs) for _____________ weeks    Please discuss all of your current medications with your surgeon at your preoperative appointment. Your surgeon will inform you regarding which medications to stop before surgery and which  medications you are to take the morning of surgery. Medications to Stop  To minimize the risk of blood loss during surgery,  you must avoid or stop taking medicines that  contain anti-inflammatories, blood thinners, arthritis  medications, and herbal supplements seven to 14 days  before your surgery. A nurse from pre-anesthesia  testing will review your list of medication. Your current  medications will be reviewed at your preoperative  appointment with your surgeon. Note: You may take prescribed narcotics, such as  Vicodin, Ultram and Neurontin.   Diabetic Medications  Adjustments in your diabetic medications will be discussed with your surgeon at your  preoperative appointment. Birth Control  In order to decrease your chance of getting a postoperative blood clot you will be required to stop  any oral contraceptive two weeks before your surgery date. During this time it is your responsibility  to use an effective form of birth control. You will be required to take a pregnancy test the morning  of surgery at the hospital and surgery will be canceled if you are pregnant. We strongly encourage  that you resume your birth control two weeks after surgery or after your first menstrual cycle  following surgery. Do NOT start any new medications within a month of surgery without  discussing it with your surgeon and/or bariatric team first.  Non-Steroidal Anti-Inflammatory Drugs (NSAIDs)  Bypass:  One class of medications to avoid after Phyllis-en-Y gastric  bypass is NSAIDs. These can cause ulcers or stomach irritation  and are linked to a kind of ulcer called a marginal ulcer after  gastric bypass. Marginal ulcers can bleed or perforate. Usually  they are not fatal, but they can cause months or years of pain,  and are a common cause of re-operation and reversal of gastric  bypass. You will NEVER be able to take NSAIDs again. Your only choice for over the counter pain medication will be  Tylenol (acetaminophen). Steroid use can also be harmful to your stomach but may be necessary in some situations. Please consult with your bariatric surgeon and prescribing physician for approval.  Sleeve gastrectomy:  Following a sleeve gastrectomy you will not be allowed to take NSAIDs unless it has been  discussed and approved by your surgeon. Most commonly taken NSAIDs to be AVOIDED!! 1. Ibuprofen  2. Advil  3. Motrin  4. Excedrin  5. Aspirin  6. Celebrex  7. Naproxen  8. Aleve  9. Voltaren  10. Mobic    ___Pregnancy  It is in your best interest to avoid pregnancy for  12 to 18 months after surgery.  Pregnancy during  the rapid weight loss phase can be dangerous  and harmful to both mother and fetus. Do you have an effective method of birth  control? Please consult with your OB/GYN or  PCP for consultation. Alcohol  Alcohol is not recommended after bariatric  surgery. Alcohol contains calories but minimal  nutrition and will work against your weight  loss goal. For example, wine contains twice  the calories per ounce that regular soda does. The absorption of alcohol changes with gastric  bypass and gastric sleeve because an enzyme  in the stomach which usually begins to digest  alcohol is absent or greatly reduced making  alcohol more potent. Alcohol may also be absorbed more quickly  into the body after gastric bypass or gastric  sleeve. The absorbed alcohol will be more  potent, and studies have demonstrated  that obesity surgery patients reach a higher  alcohol level and maintain the higher levels for  a longer period than others. In some patients  alcohol use can increase and lead to alcohol  dependence or addiction. For all of these  reasons, it is recommended to avoid alcohol  after bariatric surgery. ___________________________________________  Smoking  It is required that ALL patients stop smoking  (including e-cigarettes and marijuana) and  chewing tobacco three months before  surgery. Prior to surgery your surgeon will  order a test to verify that you have quit. Your  surgery will be canceled if you are smoking. Smoking or chewing tobacco leads to  decreased blood supply to your bodys tissues  and delays healing. Smoking harms every  organ in the body and has been linked to:   Blood clots (the leading cause of death after  bariatric surgery).  Marginal ulcers after gastric bypass.  Heart disease.  Stroke.  Chronic obstructive pulmonary  (lung) disease.  Increased risk for hip fracture.  Cataracts.    Cancer of the mouth, throat, esophagus,  larynx (voice box), stomach, pancreas,  bladder, cervix, and kidney. Packing For the [place] [address] your suitcase for the hospital a day or two before your surgery. Anti-skid socks will be provided. Items to Include in Your Bag   Clothing such as short gowns, short pajamas,  shorts, tops, loose-fitting shorts, bathrobe,  capris, etc.   Tennis shoes or flat runner sole shoes that tie.  Toiletries.  Waterless hand .  Eyeglasses, contact lenses and denture cases.  A list of medications you are currently taking,  including frequency and dosages.  Magazines, books, needle work, crossword  puzzles, etc.   A method of payment to pay for prescriptions. What Not to Bring to the [address] [place] [address] wallet or purse.  Jewelry or other valuables.  Open-toe slippers or shoes without backs. Countdown to Surgery  14 to 30 Days   Attend a preoperative class with the  dietitian and bariatric coordinator.  Pre-admission testing will contact you.  Schedule your preoperative appointment  with your surgeon. 10 to 14 Days   Stop taking medications as instructed by  your physician. Seven Days   Start liquid diet.  Stop all NSAIDS/aspirin. Three Days Before Surgery   Begin CHG skin prep. Day Before Surgery   Pack for the hospital.   Surgical time will be provided via phone call.  YOU MAY NOT HAVE ANYTHING TO EAT  OR DRINK AFTER MIDNIGHT ON THE DAY  BEFORE YOUR SURGERY. This includes gum,  mints, water, etc. You may brush your teeth  the morning of surgery but do not swallow  the water. Simply rinse your mouth out. Day of Surgery   Take medications and brush your teeth  with a sip (1 teaspoon) of water (only the  medications you have been instructed to  take by your surgeon).  Remember to report two hours before your  surgery time.  This will give the nursing staff  sufficient time to start IVs, prep you for  surgery and answer questions  If instructed by your surgeon to use sliding scale insulin   o Use regular insulin (Novolog Pen) according to the following insulin sliding scale:  BLOOD SUGAR: AMOUNT OF INSULIN:  Under 150 no insulin  150-200 2 units  201-250 4 units  251-300 6 units  301-350 8 units  351-400 10 units  400 or greater 12 units and call physician    Things to do following the preoperative class:  ? Thoroughly read this binder before surgery. Things to do before surgery:  ? Start the preoperative liquid diet on: _____________________________________________  ? Stop all NSAIDS (see page 30) and aspirin seven days before my surgery: _________________ .  S[place] Can my doctor(PCP or surgeon) regarding stopping Coumadin, Plavix or  other blood thinners. ? Purchase bariatric clear liquids (Crystal Lite, sugar free Jell-O, broth, sugar free popsicles,  protein supplement) and bariatric soft and moist foods (low fat yogurt, cottage cheese, eggs,  tuna, fish, chicken) so that Im prepared when I get home from the hospital.  ? Purchase all vitamins that will be required following surgery. o Chewable multivitamin -- two per day (ex: Flintstones Complete). o Calcium Citrate -- 1,500 milligrams (500 milligrams, three times a day). o Vitamin B-12 -- 1,000 micrograms daily. o Vitamin D3 -- 5,000 IU daily. ? Create a system to keep track of how many ounces of fluid I am drinking daily  o Postoperative GOAL = minimum of 64 ounces per day. ? Purchase a protein supplement that I like.  o Brand: _ _________________________________________________________________ .  o Ounces: _________________________________________________________________ .  Neri Hollis of protein per serving: _________________________________________________ . -- 28 --  PATIENT GUIDE TO Juliette Lim 950  6. YOUR SURGERY  Day of Surgery  Before [place] your teeth -- upon awakening, you may brush your teeth and rinse with water, but do not  swallow the water.    Take medication -- take only the medications as instructed by your provider with a small sip of  water as soon as you get up.  Wear proper clothing that is loose-fitting and easily removed.  Avoid back zippers and pantyhose.  Please remove ALL jewelry (leave ALL jewelry and valuables at home).  Avoid using perfumes, deodorant, shaving creams or any scented lotions.  Bring a case with your name on it to hold your eyeglasses, contact lenses, hearing aids  and dentures. Reporting to the [address] [place] [address] will be asked to arrive approximately two  hours before your scheduled surgery. It is important  that you arrive on time to the hospital to avoid any  problems with starting your surgery on time. In some  cases lateness could result in moving your surgery to  a much later time. Your physicians office will provide  your time of arrival to the hospital.  Where Do You Report the Day of Surgery? Nicole: [address] , [facility], [address]. Enter through the main entrance. Turn left just past the information desk into the  Registration Office. You will check in for surgery there. You may designate one person to be contacted when your surgery has been completed. -- 36 --  [facility]  Before Surgery  Preoperative    Preoperative Preparation Area  Once you arrive at the hospital you will be escorted to the preoperative preparation area. During the preoperative phase, a nurse will review your medical records and conduct a brief  physical examination to include vital signs (i.e., blood pressure, pulse, temperature, respirations or  breathing). An intravenous tube will be inserted with IV fluids. Your skin will be cleansed with CHG  wipes. If you wear dentures, eyeglasses or contact lenses you will need to remove them at this time.   You will see your surgeon, your anesthesiologist and meet the members of your surgical team.  Medications, such as antibiotics, may be given by anesthetists to decrease your infection risk. Other medications may be given to allay any anxiety you may have. You are allowed to have two family members or friends in the preoperative area before surgery. Going into Surgery  The operating room team will escort you into the operating room where your abdomen will be  prepared for surgery. There will be a time out -- a verification of the correct operative site for  patient safety purposes -- performed. Once in the operating room, monitoring devices, such  as a blood pressure cuff, heart monitor and oxygen monitor, will be attached. You will be given  supplemental oxygen as you are readied for anesthesia.  The average length of time for a laparoscopic sleeve gastrectomy is 60 to 90 minutes.  The average length of time for a Phyllis-en-Y gastric bypass is two to two and a half hours. In the Recovery Area  After your surgery is completed, you will be wheeled into the recovery room. In the recovery room:   Nurses will frequently check your vital signs (i.e., blood pressure, pulse, breathing).  Nurses will medicate you for your pain as needed through the IV line.  Nurses will encourage you to take deep breaths and to move your ankles and feet. Please inform your family the length of time in the recoveryYou will move to your hospital room from the recovery area when you are ready. Your post-surgical  recovery begins here. room will jake  What to Expect During [address]  From the recovery room, you will be transferred to your hospital room on the bariatric unit  known as 2 Surgical. The private rooms are spacious with large windows and beautiful views. The staff is specially trained in caring for bariatric patients and are extremely friendly and  knowledgeable. We look forward to caring for you during your hospital stay. IV -- IV fluids will be given to help nourish your body after surgery. You will also be given IV pain  medication.  IV fluids will continue until you are discharged from the hospital.  Heart rate monitor (telemetry) -- A heart rate monitor will be worn only in the recovery room  unless otherwise indicated. Gordon catheter -- A Gordon catheter will be placed in your bladder while you are in the OR and  removed when you arrive to the recovery room. Nasal cannula -- Oxygen will be worn in the nose the night of surgery. Anticoagulation -- A blood thinner may be administered to you to prevent blood clots. Lovenox,  an injectable medication will be used. Drainage tube -- A drainage tube may be inserted into your abdomen during surgery. This tube  collects bloody drainage after surgery. This may be removed prior to discharge from the hospital or  you may be discharged with the drain and it will be removed by your surgeon at your postoperative  visit. If you are discharged with a drain you will be taught how to empty it and care for it. After Surgery   If you do not see your providers clean their hands, please ask them to do so.  Family and friends who visit you should not touch the surgical wound or dressings.  Family and friends should clean their hands with soap and water or an alcohol-based hand  rub before and after visiting you. If you do not see them clean their hands, ask them to clean  their hands.  Make sure you understand how to care for your incision before you leave the hospital.   Always clean your hands before and after caring for your incision.  Make sure you know who to contact if you have questions or problems after you get home.  If you have any symptoms of an infection, such as redness and pain at the surgery site, drainage,  or fever, call your doctor immediately.  Ask your nursing staff to help you out of bed to walk within five hours of arriving at your  hospital room. Getting out of bed to walk helps to decrease complications, such as blood clots  and pneumonia.         Length of Stay  You will be required to stay in the hospital for at least ONE night, possibly TWO. Length of Stay  You will be required to stay in the hospital for at least ONE night, possibly TWO. Medications and Pain Control Options  There are many types of pain control methods that are available to control discomfort. Effective  pain control will allow you to be up and walking shortly after surgery. Your doctor will choose  the method that is right for you. Regardless of the method of pain medication being used, it is  important for you to communicate with your nurse if the pain medication is not enough. Call your  nurse for pain medication when the pain is moderate instead of waiting for when pain is severe. What type of pain should I expect?  Abdominal pain   Rib pain   Shoulder pain   Brick feeling in the center of your chest  Nausea:  Nausea following bariatric surgery is very  common. Causes include:   Anesthesia   Drinking too fast   Pain medication   Surgery itself        Length of Stay  You willExpectations on your Day of Surgery   You will be allowed 1 to 2 ounces of ice chips per hour when you are admitted to the floor. They are solely for your comfort. They are not required.  You will be expected to walk the night of your surgery. Please ask your nurse or nursing assistant  for help the first time you walk. Please do not walk if you still feel groggy or unsteady on your feet.  Your pain will be controlled with oral and IV pain medication on the day of surgery.  In order to prevent pneumonia after surgery you please use your incentive spirometer (ICS)  at least 10 times per hour (see below). be [email or web address]  Bariatric Clear Liquid Diet  You will be started on the bariatric clear liquid diet the morning after your surgery. Your GOAL  will be to drink 4 ounces per hour (SLOW and STEADY) of the following liquids: water, crystal lite,  Jell-O, broth and Unjury (protein supplement).  Feel free to bring your favorite protein supplement  from home.  Two important requirements when drinking is you must slowly SIP the fluid and you must be  SITTING up. Walking  while in the hospital you are expected to walk EVERY hour in the hallway. During your hospital  stay you will also be expected to sit in the recliner throughout the day rather than lie in bed. Pain Medication  The morning after surgery you will continue with oral pain medication ONLY for your pain control. Incentive Spirometer  Continue using your incentive spirometer(ICS) 10 times per hour. Report Card    met in order to be discharged from the hospital.   Able to tolerate 4 ounces of bariatric clear liquids per hour for three consecutive hours.  Void after FoGoals for Discharge  The below goals need to have been met in order to be discharged from the hospital.   Able to tolerate 4 ounces of bariatric clear liquids per hour for three consecutive hours.  Void after Gordon catheter was removed.  Your pain under control with oral pain medications.  Your nausea is under control.  Walking. libertad catheter was removed.  Your pain under control with oral pain medications.  Your nausea is under control.  Walking. uired to stay in the hospital for at least ONE night, possibly TWO. Length of Stay  You will be required to stay in the hospital for at least ONE night, possibly TWO. Length of Sta  y  You will be required to stay in the hospital for at least ONE night, possibly TWO.

## 2021-03-21 ENCOUNTER — ANESTHESIA EVENT (OUTPATIENT)
Dept: SURGERY | Age: 41
DRG: 621 | End: 2021-03-21
Payer: COMMERCIAL

## 2021-03-22 ENCOUNTER — HOSPITAL ENCOUNTER (INPATIENT)
Age: 41
LOS: 1 days | Discharge: HOME | DRG: 621 | End: 2021-03-23
Attending: SURGERY | Admitting: SURGERY
Payer: COMMERCIAL

## 2021-03-22 ENCOUNTER — ANESTHESIA (OUTPATIENT)
Dept: SURGERY | Age: 41
DRG: 621 | End: 2021-03-22
Payer: COMMERCIAL

## 2021-03-22 DIAGNOSIS — G89.18 POST-OP PAIN: Primary | ICD-10-CM

## 2021-03-22 DIAGNOSIS — E66.01 MORBID OBESITY (HCC): ICD-10-CM

## 2021-03-22 LAB
AMPHET UR QL SCN: NEGATIVE
BARBITURATES UR QL SCN: NEGATIVE
BENZODIAZ UR QL: NEGATIVE
CANNABINOIDS UR QL SCN: NEGATIVE
COCAINE UR QL SCN: NEGATIVE
GLUCOSE BLD STRIP.AUTO-MCNC: 121 MG/DL (ref 70–110)
HCG UR QL: NEGATIVE
HCG UR QL: NEGATIVE
HDSCOM,HDSCOM: NORMAL
METHADONE UR QL: NEGATIVE
OPIATES UR QL: NEGATIVE
PCP UR QL: NEGATIVE

## 2021-03-22 PROCEDURE — C9290 INJ, BUPIVACAINE LIPOSOME: HCPCS | Performed by: SURGERY

## 2021-03-22 PROCEDURE — 77030040361 HC SLV COMPR DVT MDII -B: Performed by: SURGERY

## 2021-03-22 PROCEDURE — 77030009968 HC RELD STPLR ENDOSC J&J -D: Performed by: SURGERY

## 2021-03-22 PROCEDURE — 77030008756 HC TU IRR SUC STRY -B: Performed by: SURGERY

## 2021-03-22 PROCEDURE — 43644 LAP GASTRIC BYPASS/ROUX-EN-Y: CPT | Performed by: SURGERY

## 2021-03-22 PROCEDURE — 74011250636 HC RX REV CODE- 250/636: Performed by: SURGERY

## 2021-03-22 PROCEDURE — 77030040922 HC BLNKT HYPOTHRM STRY -A: Performed by: SURGERY

## 2021-03-22 PROCEDURE — 77030008603 HC TRCR ENDOSC EPATH J&J -C: Performed by: SURGERY

## 2021-03-22 PROCEDURE — 00797 ANES IPER UPR ABD GSTR PX MO: CPT | Performed by: NURSE ANESTHETIST, CERTIFIED REGISTERED

## 2021-03-22 PROCEDURE — 77030019605: Performed by: SURGERY

## 2021-03-22 PROCEDURE — 43644 LAP GASTRIC BYPASS/ROUX-EN-Y: CPT | Performed by: NURSE PRACTITIONER

## 2021-03-22 PROCEDURE — 77030013079 HC BLNKT BAIR HGGR 3M -A: Performed by: ANESTHESIOLOGY

## 2021-03-22 PROCEDURE — 81025 URINE PREGNANCY TEST: CPT

## 2021-03-22 PROCEDURE — 77030002933 HC SUT MCRYL J&J -A: Performed by: SURGERY

## 2021-03-22 PROCEDURE — 77030016151 HC PROTCTR LNS DFOG COVD -B: Performed by: SURGERY

## 2021-03-22 PROCEDURE — 82962 GLUCOSE BLOOD TEST: CPT

## 2021-03-22 PROCEDURE — 76210000016 HC OR PH I REC 1 TO 1.5 HR: Performed by: SURGERY

## 2021-03-22 PROCEDURE — 74011250637 HC RX REV CODE- 250/637: Performed by: SURGERY

## 2021-03-22 PROCEDURE — 2709999900 HC NON-CHARGEABLE SUPPLY: Performed by: SURGERY

## 2021-03-22 PROCEDURE — 74011250636 HC RX REV CODE- 250/636: Performed by: NURSE ANESTHETIST, CERTIFIED REGISTERED

## 2021-03-22 PROCEDURE — 77030020268 HC MISC GENERAL SUPPLY: Performed by: SURGERY

## 2021-03-22 PROCEDURE — 2709999900 HC NON-CHARGEABLE SUPPLY

## 2021-03-22 PROCEDURE — 00797 ANES IPER UPR ABD GSTR PX MO: CPT | Performed by: ANESTHESIOLOGY

## 2021-03-22 PROCEDURE — 77030008683 HC TU ET CUF COVD -A: Performed by: ANESTHESIOLOGY

## 2021-03-22 PROCEDURE — 74011000250 HC RX REV CODE- 250: Performed by: NURSE ANESTHETIST, CERTIFIED REGISTERED

## 2021-03-22 PROCEDURE — 0D164ZA BYPASS STOMACH TO JEJUNUM, PERCUTANEOUS ENDOSCOPIC APPROACH: ICD-10-PCS | Performed by: SURGERY

## 2021-03-22 PROCEDURE — 77030008598 HC TRCR ENDOSC BLDLS J&J -B: Performed by: SURGERY

## 2021-03-22 PROCEDURE — 77030026438 HC STYL ET INTUB CARD -A: Performed by: ANESTHESIOLOGY

## 2021-03-22 PROCEDURE — 77030036732 HC RELD STPLR VASC J&J -F: Performed by: SURGERY

## 2021-03-22 PROCEDURE — 77030034154 HC SHR COAG HARM ACE J&J -F: Performed by: SURGERY

## 2021-03-22 PROCEDURE — 77030027138 HC INCENT SPIROMETER -A

## 2021-03-22 PROCEDURE — 77030010031 HC SCIS ENDOSC MPLR J&J -C: Performed by: SURGERY

## 2021-03-22 PROCEDURE — 74011250636 HC RX REV CODE- 250/636

## 2021-03-22 PROCEDURE — 76060000035 HC ANESTHESIA 2 TO 2.5 HR: Performed by: SURGERY

## 2021-03-22 PROCEDURE — 76010000131 HC OR TIME 2 TO 2.5 HR: Performed by: SURGERY

## 2021-03-22 PROCEDURE — 77030031139 HC SUT VCRL2 J&J -A: Performed by: SURGERY

## 2021-03-22 PROCEDURE — 77030008602 HC TRCR ENDOSC EPATH J&J -B: Performed by: SURGERY

## 2021-03-22 PROCEDURE — 77030002996 HC SUT SLK J&J -A: Performed by: SURGERY

## 2021-03-22 PROCEDURE — 74011000250 HC RX REV CODE- 250: Performed by: SURGERY

## 2021-03-22 PROCEDURE — 77030027876 HC STPLR ENDOSC FLX PWR J&J -G1: Performed by: SURGERY

## 2021-03-22 PROCEDURE — 77030010507 HC ADH SKN DERMBND J&J -B: Performed by: SURGERY

## 2021-03-22 PROCEDURE — 74011000272 HC RX REV CODE- 272: Performed by: SURGERY

## 2021-03-22 PROCEDURE — 77030040830 HC CATH URETH FOL MDII -A: Performed by: SURGERY

## 2021-03-22 PROCEDURE — 65270000029 HC RM PRIVATE

## 2021-03-22 PROCEDURE — 80307 DRUG TEST PRSMV CHEM ANLYZR: CPT

## 2021-03-22 RX ORDER — ENOXAPARIN SODIUM 100 MG/ML
40 INJECTION SUBCUTANEOUS DAILY
Status: DISCONTINUED | OUTPATIENT
Start: 2021-03-23 | End: 2021-03-23 | Stop reason: HOSPADM

## 2021-03-22 RX ORDER — SODIUM CHLORIDE 0.9 % (FLUSH) 0.9 %
5-40 SYRINGE (ML) INJECTION EVERY 8 HOURS
Status: DISCONTINUED | OUTPATIENT
Start: 2021-03-22 | End: 2021-03-22 | Stop reason: HOSPADM

## 2021-03-22 RX ORDER — SUCCINYLCHOLINE CHLORIDE 20 MG/ML
INJECTION INTRAMUSCULAR; INTRAVENOUS AS NEEDED
Status: DISCONTINUED | OUTPATIENT
Start: 2021-03-22 | End: 2021-03-22 | Stop reason: HOSPADM

## 2021-03-22 RX ORDER — PROMETHAZINE HYDROCHLORIDE 25 MG/ML
INJECTION, SOLUTION INTRAMUSCULAR; INTRAVENOUS
Status: COMPLETED
Start: 2021-03-22 | End: 2021-03-22

## 2021-03-22 RX ORDER — HYDROMORPHONE HYDROCHLORIDE 2 MG/ML
0.5 INJECTION, SOLUTION INTRAMUSCULAR; INTRAVENOUS; SUBCUTANEOUS AS NEEDED
Status: DISCONTINUED | OUTPATIENT
Start: 2021-03-22 | End: 2021-03-22 | Stop reason: HOSPADM

## 2021-03-22 RX ORDER — ENOXAPARIN SODIUM 100 MG/ML
40 INJECTION SUBCUTANEOUS ONCE
Status: COMPLETED | OUTPATIENT
Start: 2021-03-22 | End: 2021-03-22

## 2021-03-22 RX ORDER — BUPROPION HYDROCHLORIDE 300 MG/1
300 TABLET ORAL
Status: DISCONTINUED | OUTPATIENT
Start: 2021-03-23 | End: 2021-03-23 | Stop reason: HOSPADM

## 2021-03-22 RX ORDER — HYOSCYAMINE SULFATE 0.12 MG/1
0.12 TABLET SUBLINGUAL
Status: DISCONTINUED | OUTPATIENT
Start: 2021-03-22 | End: 2021-03-23 | Stop reason: HOSPADM

## 2021-03-22 RX ORDER — ONDANSETRON 2 MG/ML
INJECTION INTRAMUSCULAR; INTRAVENOUS AS NEEDED
Status: DISCONTINUED | OUTPATIENT
Start: 2021-03-22 | End: 2021-03-22 | Stop reason: HOSPADM

## 2021-03-22 RX ORDER — ONDANSETRON 2 MG/ML
4 INJECTION INTRAMUSCULAR; INTRAVENOUS
Status: DISCONTINUED | OUTPATIENT
Start: 2021-03-22 | End: 2021-03-23 | Stop reason: HOSPADM

## 2021-03-22 RX ORDER — ONDANSETRON 2 MG/ML
4 INJECTION INTRAMUSCULAR; INTRAVENOUS ONCE
Status: COMPLETED | OUTPATIENT
Start: 2021-03-22 | End: 2021-03-22

## 2021-03-22 RX ORDER — ROCURONIUM BROMIDE 10 MG/ML
INJECTION, SOLUTION INTRAVENOUS AS NEEDED
Status: DISCONTINUED | OUTPATIENT
Start: 2021-03-22 | End: 2021-03-22 | Stop reason: HOSPADM

## 2021-03-22 RX ORDER — PROPOFOL 10 MG/ML
INJECTION, EMULSION INTRAVENOUS AS NEEDED
Status: DISCONTINUED | OUTPATIENT
Start: 2021-03-22 | End: 2021-03-22 | Stop reason: HOSPADM

## 2021-03-22 RX ORDER — CEFAZOLIN SODIUM 2 G/50ML
2 SOLUTION INTRAVENOUS
Status: COMPLETED | OUTPATIENT
Start: 2021-03-22 | End: 2021-03-22

## 2021-03-22 RX ORDER — OXYCODONE HYDROCHLORIDE 5 MG/1
5 TABLET ORAL
Status: DISCONTINUED | OUTPATIENT
Start: 2021-03-22 | End: 2021-03-23 | Stop reason: HOSPADM

## 2021-03-22 RX ORDER — MAGNESIUM SULFATE 100 %
4 CRYSTALS MISCELLANEOUS AS NEEDED
Status: DISCONTINUED | OUTPATIENT
Start: 2021-03-22 | End: 2021-03-22 | Stop reason: HOSPADM

## 2021-03-22 RX ORDER — FENTANYL CITRATE 50 UG/ML
INJECTION, SOLUTION INTRAMUSCULAR; INTRAVENOUS AS NEEDED
Status: DISCONTINUED | OUTPATIENT
Start: 2021-03-22 | End: 2021-03-22 | Stop reason: HOSPADM

## 2021-03-22 RX ORDER — PROMETHAZINE HYDROCHLORIDE 25 MG/ML
12.5 INJECTION, SOLUTION INTRAMUSCULAR; INTRAVENOUS AS NEEDED
Status: COMPLETED | OUTPATIENT
Start: 2021-03-22 | End: 2021-03-22

## 2021-03-22 RX ORDER — SODIUM CHLORIDE, SODIUM LACTATE, POTASSIUM CHLORIDE, CALCIUM CHLORIDE 600; 310; 30; 20 MG/100ML; MG/100ML; MG/100ML; MG/100ML
75 INJECTION, SOLUTION INTRAVENOUS CONTINUOUS
Status: DISCONTINUED | OUTPATIENT
Start: 2021-03-22 | End: 2021-03-22 | Stop reason: HOSPADM

## 2021-03-22 RX ORDER — DEXTROSE 50 % IN WATER (D50W) INTRAVENOUS SYRINGE
25-50 AS NEEDED
Status: DISCONTINUED | OUTPATIENT
Start: 2021-03-22 | End: 2021-03-22 | Stop reason: HOSPADM

## 2021-03-22 RX ORDER — DIPHENHYDRAMINE HYDROCHLORIDE 50 MG/ML
25 INJECTION, SOLUTION INTRAMUSCULAR; INTRAVENOUS
Status: DISCONTINUED | OUTPATIENT
Start: 2021-03-22 | End: 2021-03-23 | Stop reason: HOSPADM

## 2021-03-22 RX ORDER — LIDOCAINE HYDROCHLORIDE 20 MG/ML
INJECTION, SOLUTION EPIDURAL; INFILTRATION; INTRACAUDAL; PERINEURAL AS NEEDED
Status: DISCONTINUED | OUTPATIENT
Start: 2021-03-22 | End: 2021-03-22 | Stop reason: HOSPADM

## 2021-03-22 RX ORDER — LEVOTHYROXINE SODIUM 125 UG/1
125 TABLET ORAL
Status: DISCONTINUED | OUTPATIENT
Start: 2021-03-23 | End: 2021-03-23 | Stop reason: HOSPADM

## 2021-03-22 RX ORDER — HYDROMORPHONE HYDROCHLORIDE 1 MG/ML
1 INJECTION, SOLUTION INTRAMUSCULAR; INTRAVENOUS; SUBCUTANEOUS
Status: DISCONTINUED | OUTPATIENT
Start: 2021-03-22 | End: 2021-03-23 | Stop reason: HOSPADM

## 2021-03-22 RX ORDER — SODIUM CHLORIDE, SODIUM LACTATE, POTASSIUM CHLORIDE, CALCIUM CHLORIDE 600; 310; 30; 20 MG/100ML; MG/100ML; MG/100ML; MG/100ML
150 INJECTION, SOLUTION INTRAVENOUS CONTINUOUS
Status: DISCONTINUED | OUTPATIENT
Start: 2021-03-22 | End: 2021-03-23 | Stop reason: HOSPADM

## 2021-03-22 RX ORDER — DEXAMETHASONE SODIUM PHOSPHATE 4 MG/ML
INJECTION, SOLUTION INTRA-ARTICULAR; INTRALESIONAL; INTRAMUSCULAR; INTRAVENOUS; SOFT TISSUE AS NEEDED
Status: DISCONTINUED | OUTPATIENT
Start: 2021-03-22 | End: 2021-03-22 | Stop reason: HOSPADM

## 2021-03-22 RX ORDER — INSULIN LISPRO 100 [IU]/ML
INJECTION, SOLUTION INTRAVENOUS; SUBCUTANEOUS ONCE
Status: DISCONTINUED | OUTPATIENT
Start: 2021-03-22 | End: 2021-03-22 | Stop reason: HOSPADM

## 2021-03-22 RX ORDER — SODIUM CHLORIDE 0.9 % (FLUSH) 0.9 %
5-40 SYRINGE (ML) INJECTION AS NEEDED
Status: DISCONTINUED | OUTPATIENT
Start: 2021-03-22 | End: 2021-03-22 | Stop reason: HOSPADM

## 2021-03-22 RX ORDER — ACETAMINOPHEN 650 MG/1
650 SUPPOSITORY RECTAL ONCE
Status: COMPLETED | OUTPATIENT
Start: 2021-03-22 | End: 2021-03-22

## 2021-03-22 RX ORDER — ACETAMINOPHEN 325 MG/1
650 TABLET ORAL EVERY 6 HOURS
Status: DISCONTINUED | OUTPATIENT
Start: 2021-03-22 | End: 2021-03-23 | Stop reason: HOSPADM

## 2021-03-22 RX ORDER — MIDAZOLAM HYDROCHLORIDE 1 MG/ML
INJECTION, SOLUTION INTRAMUSCULAR; INTRAVENOUS AS NEEDED
Status: DISCONTINUED | OUTPATIENT
Start: 2021-03-22 | End: 2021-03-22 | Stop reason: HOSPADM

## 2021-03-22 RX ORDER — LIDOCAINE HYDROCHLORIDE 10 MG/ML
0.1 INJECTION, SOLUTION EPIDURAL; INFILTRATION; INTRACAUDAL; PERINEURAL AS NEEDED
Status: DISCONTINUED | OUTPATIENT
Start: 2021-03-22 | End: 2021-03-22 | Stop reason: HOSPADM

## 2021-03-22 RX ORDER — KETOROLAC TROMETHAMINE 15 MG/ML
15 INJECTION, SOLUTION INTRAMUSCULAR; INTRAVENOUS EVERY 6 HOURS
Status: COMPLETED | OUTPATIENT
Start: 2021-03-22 | End: 2021-03-23

## 2021-03-22 RX ORDER — NALOXONE HYDROCHLORIDE 0.4 MG/ML
0.4 INJECTION, SOLUTION INTRAMUSCULAR; INTRAVENOUS; SUBCUTANEOUS AS NEEDED
Status: DISCONTINUED | OUTPATIENT
Start: 2021-03-22 | End: 2021-03-23 | Stop reason: HOSPADM

## 2021-03-22 RX ADMIN — SODIUM CHLORIDE, SODIUM LACTATE, POTASSIUM CHLORIDE, AND CALCIUM CHLORIDE 75 ML/HR: 600; 310; 30; 20 INJECTION, SOLUTION INTRAVENOUS at 11:34

## 2021-03-22 RX ADMIN — HYDROMORPHONE HYDROCHLORIDE 0.5 MG: 2 INJECTION, SOLUTION INTRAMUSCULAR; INTRAVENOUS; SUBCUTANEOUS at 14:45

## 2021-03-22 RX ADMIN — OXYCODONE HYDROCHLORIDE 5 MG: 5 TABLET ORAL at 21:13

## 2021-03-22 RX ADMIN — ACETAMINOPHEN 650 MG: 325 TABLET, FILM COATED ORAL at 17:09

## 2021-03-22 RX ADMIN — HYDROMORPHONE HYDROCHLORIDE 0.5 MG: 2 INJECTION, SOLUTION INTRAMUSCULAR; INTRAVENOUS; SUBCUTANEOUS at 14:23

## 2021-03-22 RX ADMIN — ONDANSETRON 4 MG: 2 INJECTION INTRAMUSCULAR; INTRAVENOUS at 12:24

## 2021-03-22 RX ADMIN — SODIUM CHLORIDE, SODIUM LACTATE, POTASSIUM CHLORIDE, AND CALCIUM CHLORIDE: 600; 310; 30; 20 INJECTION, SOLUTION INTRAVENOUS at 12:53

## 2021-03-22 RX ADMIN — KETOROLAC TROMETHAMINE 15 MG: 15 INJECTION, SOLUTION INTRAMUSCULAR; INTRAVENOUS at 17:10

## 2021-03-22 RX ADMIN — SUCCINYLCHOLINE CHLORIDE 160 MG: 20 INJECTION, SOLUTION INTRAMUSCULAR; INTRAVENOUS at 12:05

## 2021-03-22 RX ADMIN — DEXAMETHASONE SODIUM PHOSPHATE 4 MG: 4 INJECTION, SOLUTION INTRAMUSCULAR; INTRAVENOUS at 12:24

## 2021-03-22 RX ADMIN — PROPOFOL 200 MG: 10 INJECTION, EMULSION INTRAVENOUS at 12:05

## 2021-03-22 RX ADMIN — FAMOTIDINE 20 MG: 10 INJECTION INTRAVENOUS at 21:12

## 2021-03-22 RX ADMIN — CEFAZOLIN SODIUM 2 G: 2 SOLUTION INTRAVENOUS at 11:57

## 2021-03-22 RX ADMIN — MIDAZOLAM HYDROCHLORIDE 2 MG: 2 INJECTION, SOLUTION INTRAMUSCULAR; INTRAVENOUS at 11:57

## 2021-03-22 RX ADMIN — PROMETHAZINE HYDROCHLORIDE 12.5 MG: 25 INJECTION, SOLUTION INTRAMUSCULAR; INTRAVENOUS at 14:55

## 2021-03-22 RX ADMIN — LIDOCAINE HYDROCHLORIDE 100 MG: 20 INJECTION, SOLUTION EPIDURAL; INFILTRATION; INTRACAUDAL; PERINEURAL at 12:05

## 2021-03-22 RX ADMIN — FENTANYL CITRATE 100 MCG: 50 INJECTION, SOLUTION INTRAMUSCULAR; INTRAVENOUS at 12:48

## 2021-03-22 RX ADMIN — HYDROMORPHONE HYDROCHLORIDE 0.5 MG: 2 INJECTION, SOLUTION INTRAMUSCULAR; INTRAVENOUS; SUBCUTANEOUS at 14:25

## 2021-03-22 RX ADMIN — FENTANYL CITRATE 50 MCG: 50 INJECTION, SOLUTION INTRAMUSCULAR; INTRAVENOUS at 11:57

## 2021-03-22 RX ADMIN — SODIUM CHLORIDE, SODIUM LACTATE, POTASSIUM CHLORIDE, AND CALCIUM CHLORIDE 150 ML/HR: 600; 310; 30; 20 INJECTION, SOLUTION INTRAVENOUS at 23:56

## 2021-03-22 RX ADMIN — ENOXAPARIN SODIUM 40 MG: 40 INJECTION SUBCUTANEOUS at 11:35

## 2021-03-22 RX ADMIN — PROMETHAZINE HYDROCHLORIDE 12.5 MG: 25 INJECTION INTRAMUSCULAR; INTRAVENOUS at 14:55

## 2021-03-22 RX ADMIN — ACETAMINOPHEN 650 MG: 325 TABLET, FILM COATED ORAL at 21:12

## 2021-03-22 RX ADMIN — SODIUM CHLORIDE, SODIUM LACTATE, POTASSIUM CHLORIDE, AND CALCIUM CHLORIDE 150 ML/HR: 600; 310; 30; 20 INJECTION, SOLUTION INTRAVENOUS at 16:55

## 2021-03-22 RX ADMIN — FENTANYL CITRATE 100 MCG: 50 INJECTION, SOLUTION INTRAMUSCULAR; INTRAVENOUS at 12:05

## 2021-03-22 RX ADMIN — ROCURONIUM BROMIDE 10 MG: 50 INJECTION INTRAVENOUS at 13:17

## 2021-03-22 RX ADMIN — ROCURONIUM BROMIDE 45 MG: 50 INJECTION INTRAVENOUS at 12:10

## 2021-03-22 RX ADMIN — FAMOTIDINE 20 MG: 10 INJECTION INTRAVENOUS at 11:35

## 2021-03-22 RX ADMIN — ONDANSETRON HYDROCHLORIDE 4 MG: 2 SOLUTION INTRAMUSCULAR; INTRAVENOUS at 14:30

## 2021-03-22 RX ADMIN — ROCURONIUM BROMIDE 5 MG: 50 INJECTION INTRAVENOUS at 12:05

## 2021-03-22 RX ADMIN — KETOROLAC TROMETHAMINE 15 MG: 15 INJECTION, SOLUTION INTRAMUSCULAR; INTRAVENOUS at 23:56

## 2021-03-22 NOTE — INTERVAL H&P NOTE
Update History & Physical 
 
The Patient's History and Physical  was reviewed with the patient and I examined the patient. There was no change. The surgical site was confirmed by the patient and me. Plan:  The risk, benefits, expected outcome, and alternative to the recommended procedure have been discussed with the patient. Patient understands and wants to proceed with the procedure.  
 
Electronically signed by Maria Alejandra Ling MD on 3/22/2021 at 11:21 AM

## 2021-03-22 NOTE — PROGRESS NOTES
Bedside and Verbal shift change report given to NBA Emery (oncoming nurse) by Robley Phalen, RN (offgoing nurse). Report included the following information SBAR and Kardex.

## 2021-03-22 NOTE — PROGRESS NOTES
Assisted patient with the execution of Advance Medical Directive. Placed the copy into patient's \"like paper chart. \"  See Flow Sheet for other pastoral interventions. Chaplains will continue to follow and provide pastoral care on an as needed/requested basis.     [facility] Certified [address] [place] [address]  [facility]   [phone]

## 2021-03-22 NOTE — PERIOP NOTES
TRANSFER - OUT REPORT:    Verbal report given to [address] Sina Brown RN(name) on Elizabeth Horner  being transferred to 2Surgical(unit) for routine post - op       Report consisted of patients Situation, Background, Assessment and   Recommendations(SBAR). Information from the following report(s) SBAR, Kardex, Procedure Summary, Intake/Output, MAR and Recent Results was reviewed with the receiving nurse. Lines:   Peripheral IV 03/22/21 Right Antecubital (Active)   Site Assessment Clean, dry, & intact 03/22/21 1402   Phlebitis Assessment 0 03/22/21 1402   Infiltration Assessment 0 03/22/21 1402   Dressing Status Clean, dry, & intact 03/22/21 1402   Dressing Type Tape;Transparent 03/22/21 1402   Hub Color/Line Status Infusing;Pink 03/22/21 1123        Opportunity for questions and clarification was provided.       Patient transported with:   O2 @ 3 liters  Tech

## 2021-03-22 NOTE — ANESTHESIA PREPROCEDURE EVALUATION
Relevant Problems   No relevant active problems       Anesthetic History   No history of anesthetic complications            Review of Systems / Medical History  Patient summary reviewed, nursing notes reviewed and pertinent labs reviewed    Pulmonary        Sleep apnea: CPAP        Comments: Quit smoking 8 yrs ago   Neuro/Psych              Cardiovascular    Hypertension: well controlled              Exercise tolerance: >4 METS     GI/Hepatic/Renal                Endo/Other    Diabetes: well controlled  Hypothyroidism: well controlled  Morbid obesity    Comments: Hx of gestational diabetes   Other Findings              Physical Exam    Airway  Mallampati: II  TM Distance: 4 - 6 cm  Neck ROM: normal range of motion   Mouth opening: Normal     Cardiovascular    Rhythm: regular  Rate: normal         Dental  No notable dental hx       Pulmonary  Breath sounds clear to auscultation               Abdominal  GI exam deferred       Other Findings            Anesthetic Plan    ASA: 2  Anesthesia type: general          Induction: Intravenous  Anesthetic plan and risks discussed with: Patient

## 2021-03-22 NOTE — ANESTHESIA POSTPROCEDURE EVALUATION
Procedure(s):  LAPAROSCOPIC CHEYENNE-EN-Y GASTRIC BYPASS. general    Anesthesia Post Evaluation      Multimodal analgesia: multimodal analgesia used between 6 hours prior to anesthesia start to PACU discharge  Patient location during evaluation: PACU  Patient participation: complete - patient participated  Level of consciousness: awake and alert  Pain management: adequate  Airway patency: patent  Anesthetic complications: no  Cardiovascular status: acceptable  Respiratory status: acceptable  Hydration status: acceptable  Post anesthesia nausea and vomiting:  none  Final Post Anesthesia Temperature Assessment:  Normothermia (36.0-37.5 degrees C)      INITIAL Post-op Vital signs:   Vitals Value Taken Time   /78 03/22/21 1432   Temp 36.5 °C (97.7 °F) 03/22/21 1404   Pulse 80 03/22/21 1436   Resp 16 03/22/21 1436   SpO2 91 % 03/22/21 1436   Vitals shown include unvalidated device data.

## 2021-03-22 NOTE — OP NOTES
DATE: 3/22/2021  [facility] HAS DEMANDED TO CLARIFY WHETHER INTERNS OR RESIDENTS ARE AVAILABLE FOR ASSISTING FOR ANY CASE SUBMITTED TO THEM FOR PAYMENT. TO CLARIFY, I DO NOT TEACH RESIDENTS NOR INTERNS. THEY ARE NOT NOW, IN THE PAST NOR AT ANY ANTICIPATED POINT IN THE FUTURE, AVAILABLE TO PROVIDE ASSISTANCE IN THE OPERATING ROOM FOR SURGICAL CASES THAT I PERFORM. PREOPERATIVE DIAGNOSIS: Clinically severe obesity, body mass index of 47,   comorbidities of hypertension, stress urinary incontinence. POSTOPERATIVE DIAGNOSIS: Clinically severe obesity, body mass index of 47,   comorbidities of hypertension, stress urinary incontinence  PROCEDURES: Laparoscopic Phyllis-en-Y gastric bypass with 518 cm retrocolic   retrogastric Phyllis limb, 40 cm biliopancreatic limb, 15 ml    tubularized gastric pouch applied to the lesser curvature of stomach  SURGEON: Dr. Renetta Mcclelland. Alexy Vega MD, FACS   ASSISTANT: Amanda Owen NP  ANESTHESIA: General endotracheal anesthesia. Local anesthetic Exparel 20 mL. FINDINGS: None  SPECIMEN: None  IMPLANTS: * No implants in log *  ESTIMATED BLOOD LOSS: 25 ml  FLUIDS: 1500 ml   URINE OUTPUT: 50 ml   DRAIN: None  COMPLICATIONS: None  OPERATIVE START TIME: 1220  OPERATIVE COMPLETION TIME: 1352    DESCRIPTION OF PROCEDURE: The patient was prepped and draped in standard sterile fashion after being placed under general anesthetic. A site was selected superior to the umbilicus in the midline. This area was incised. A 5  mm Optical trocar was placed over the laparoscope and directed into the abdominal cavity using Optiview technique. Abdomen was insufflated to 15 mmHg and surveyed. There was no evidence of injury from the entry. A bilateral subcostal laparoscopic visualized transversus abdominus/pre-peritoneal space block was placed using 20 ml of Exparel diluted with 80 ml of normal saline without difficulty. Additional trocars were then placed.  My assistant place one 5 mm trocar was placed in the anterior axillary line left upper quadrant approximately 3 fingerbreadths below the costal margin and another 12 mm trocar was placed in the lateral portion of the left rectus sheath approximately 3 fingerbreadths lateral to the umbilical trocar. I then placed another 12 mm trocar was placed approximately 4 fingerbreadths lateral to the umbilical trocar in the  lateral portion of right rectus sheath. All were placed after incising with scalpel, all were placed using blunt dissecting technique. There was no evidence of injury from the entries. Instrument were placed in the abdominal cavity. The omentum and transverse colon were retracted superiorly, exposing ligament of Treitz. The small bowel was measured 40 cm distal to the ligament of Treitz, divided at this level with A 60 mm [employer] stapler. While my assistant exposed the root of the mesentery, I used. harmonic dissection was used to continue the division to the base of the mesentery, confirming preservation of blood flow to the small bowel above and below the staple line prior to firing. Then, from the distal staple line, the Phyllis limb was measured 100 cm distal and an antimesenteric enterotomy was made. Another antimesenteric enterotomy was made just proximal to the proximal staple line of the biliopancreatic limb. Through these enterotomies, a 60 mm [employer] stapler was placed into the bowel, clamped on the antimesenteric borders and fired to form a stapled side-to-side anastomosis. The remaining enterotomy was closed in a running inverting fashion with 3-0 Vicryl suture. The mesenteric defect was then closed with running 2-0 silk suture, extending on the bowel wall in a running Lembert fashion for second layer closure of the enterotomy. At this point, attention was directed to the transverse mesocolon. While my assistant exposed the bare area of the transverse mesentery above the ligament of Trietz.  A site was selected just anterior to the ligament of Treitz. This area was incised, entering the lesser sac. The Phyllis limb was then passed through the fenestration of the transverse mesocolon and into the lesser sac taking care not to twist on its mesentery. At this point, the omentum and transverse colon were retracted inferiorly. The greater curvature of the stomach was then grasped and the gastrocolic ligament was retracted by my assistant and I then  dissected the ligament directly off of the wall of the stomach using the Harmonic scalpel to widely open the lesser sac. My assistant exposed the depths of the lesser sac to allow me to take down adhesions between the posterior wall of the stomach and the retroperitoneum down under direct vision to fully free the lesser sac. At this point, an incision was made near the xiphoid. Through this incision, a Rodrigo retractor was placed through the abdominal wall beneath the left lateral segment of the liver and connected to a bedside retraction system allowing exposure of the esophageal hiatus. While my assistant retracted the stomach and lesser omentum inferiorly, I dissected the angle of His  anterior to posterior down the left sandy of the diaphragm using Harmonic scalpel to assist in eventual division of the gastric pouch and distal stomach remnant. The lesser curvature of the stomach was then examined. A site was selected just proximal to the crow's foot of the vagus nerve in this area. The gastrohepatic ligament was dissected away from the lesser curvature of the stomach directly on the wall of the stomach to enter the lesser sac. This fenestration was then widened using Bovie cautery over a 10 mm articulating esophageal retractor which had been placed through the lesser sac by my assistant and brought through the fenestration with counter traction by both of us.      Then, a 60 mm [place] stapler was placed transversely across the stomach through this fenestration, clamped and then fired to begin the formation of the gastric pouch. Another stapler was placed near the crotch of the previous staple line and directed superiorly toward the angle of His, and clamped. Prior to firing, a 34-Czech orogastric Simon tube was brought through the esophagus into the stomach so its tip was against the transverse staple line, its course lying along the lesser curvature of stomach. The stapler was snugged against it and then fired. Then, a 60 mm Guion stapler with Endopath staple line reinforcement was placed in the crotch of the previous staple line and clamped and then fired from the crotch of previous staple line directed superiorly toward the angle of His until the gastric pouch was fully divided from the distal stomach remnant while my assistant exposed the lesser sac to prevent injury to the hiatus, spleen and liver. Once fully divided, the staple lines were examined, noted to be hemostatic and viable. My assistant placed the lesser omentum between the staple lines to prevent gastro-gastric fistulization. The tip of the gastric pouch placed in the lesser sac and the distal stomach remnant was retracted anteriorly to allow exposure of the lesser sac. The Denisha limb was examined. The proximal 8 cm were noted to be tethered by its mesentery. This proximal segment was elevated by my assistant and then I excised from the mesentery using a Harmonic scalpel and then one additional staple load was used to divide the devascularized segment from the remainder of the Denisha limb. This segment was brought out of the abdominal cavity via one of the trocar sites, passed off the field and discarded. The denisha limb mesentery was examined to confirm there was no twisting of the mesentery prior to sewing the anastomosis    At this point, the gastrojejunostomy was begun by sewing the right antimesenteric border of the Denisha limb to the distal portion of the gastric pouch using running suture of 3-0 Vicryl.  An anterior gastrotomy and antimesenteric enterotomy were made. From the left apex of these 2 enterotomies, a 3-0 Vicryl suture was placed and run on the posterior surface in a running inverting fashion to the right apex, transitioned on the anterior surface and sewn approximately half way across the opening in an inverting fashion. Then, another 3-0 Vicryl suture was placed at the left apex and sewn to the previous suture in a running inverting fashion on the anterior surface. Prior to tying these sutures, the Simon tube was brought across the anastomosis, then the sutures were tied, completing the inner layer. Then, from the left apex another 3-0 Vicryl suture was placed and run to the right apex in a running Lembert fashion completing the anterior outer layer os the anastomosis, thereby completing the anastomosis. Once this was complete, the Simon tube was removed from the patient. Then, working from within the lesser sac, the Denisha limb was sewn to the right anterior surface of the transverse mesocolic defect with  3 interrupted sutures of 2-0 silk. Then, the left side of the denisha limb mesentery was closed to the left side of the tranverse mesocolic defect with a running suture of 2-0 silk while my assistant exposed the defects from within the lesser sac. The omentum and transverse colon were retracted superiorly. The base of the left side of the transverse mesocolic defect was exposed by my assistant and this was sewn to the base of the left side of the Denisha limb mesentery with a pursestring suture of 2-0 silk. The Denisha limb was then retracted to the left.  The right-side of the  transverse mesocolic defect was exposed by my assistant and closed to the right side of the Denisha limb mesentery with another pursestring suture of 2-0 silk, then one additional interrupted suture was placed between the right lateral surface of the Denisha limb and the right lateral surface of the transverse mesocolic defect, completing the closure of the defect around the Phyllis limb. Once this was complete, both mesenteric defects were examined and noted to be well closed. Both anastomoses were examined and noted to be hemostatic and viable without evidence of leak. The omentum and transverse colon were retracted inferiorly back to normal position. The gastric remnant was placed over the anastomosis, returning the anastomosis into the lesser sac. The Rodrigo retractor was removed. At this point, the abdomen was desufflated via the remaining trocars. All trocars were then removed. The trocar sites were rendered hemostatic with Bovie cautery and then closed by my assistant with interrupted 4-0 Monocryl deep dermal sutures. Dermabond was applied as wound dressings. The patient tolerated the procedure well.

## 2021-03-22 NOTE — PROGRESS NOTES
Mobility Intervention:       [] Pt dangled at edge of bed    [] Pt assisted OOB to bedside commode    [] Pt assisted OOB to chair    [] Pt ambulated to bathroom    [x] Patient was ambulated in room/hallway    Assistive Device Utilized:       [] Rolling walker   [] Crutches   [] Straight Cane   [] Knee immobilizer   [x] IV pole    After Mobilization:     [] Patient left in no apparent distress sitting up in chair  [x] Patient left in no apparent distress in bed  [x] Call bell left within reach  [x] SCDs on & machine turned on  [] Ice applied  [] RN notified  [] Caregiver present  [] Bed alarm activated    Reason patient not mobilized:      [] Patient refused   [] Nausea/vomiting   [] Low blood pressure   [] Drowsy/lethargic    Pain Rating:     [] 0  [] 1  Assistive Device:        [] 2  [] 3  [] 4  [] 5  [] 6  Assistive Device:        [] 7  [] 8  [] 9  [] 10    Comments:

## 2021-03-23 VITALS
OXYGEN SATURATION: 99 % | BODY MASS INDEX: 48.49 KG/M2 | WEIGHT: 247 LBS | HEART RATE: 74 BPM | SYSTOLIC BLOOD PRESSURE: 115 MMHG | TEMPERATURE: 97.6 F | RESPIRATION RATE: 18 BRPM | DIASTOLIC BLOOD PRESSURE: 80 MMHG | HEIGHT: 60 IN

## 2021-03-23 LAB
ANION GAP SERPL CALC-SCNC: 8 MMOL/L (ref 3–18)
BUN SERPL-MCNC: 8 MG/DL (ref 7–18)
BUN/CREAT SERPL: 14 (ref 12–20)
CALCIUM SERPL-MCNC: 8.2 MG/DL (ref 8.5–10.1)
CHLORIDE SERPL-SCNC: 108 MMOL/L (ref 100–111)
CO2 SERPL-SCNC: 26 MMOL/L (ref 21–32)
CREAT SERPL-MCNC: 0.58 MG/DL (ref 0.6–1.3)
ERYTHROCYTE [DISTWIDTH] IN BLOOD BY AUTOMATED COUNT: 13.7 % (ref 11.6–14.5)
GLUCOSE SERPL-MCNC: 92 MG/DL (ref 74–99)
HCT VFR BLD AUTO: 37.4 % (ref 35–45)
HGB BLD-MCNC: 12.3 G/DL (ref 12–16)
MCH RBC QN AUTO: 30.1 PG (ref 24–34)
MCHC RBC AUTO-ENTMCNC: 32.9 G/DL (ref 31–37)
MCV RBC AUTO: 91.7 FL (ref 74–97)
PLATELET # BLD AUTO: 272 K/UL (ref 135–420)
PMV BLD AUTO: 10.1 FL (ref 9.2–11.8)
POTASSIUM SERPL-SCNC: 4.1 MMOL/L (ref 3.5–5.5)
RBC # BLD AUTO: 4.08 M/UL (ref 4.2–5.3)
SODIUM SERPL-SCNC: 142 MMOL/L (ref 136–145)
WBC # BLD AUTO: 9.9 K/UL (ref 4.6–13.2)

## 2021-03-23 PROCEDURE — 85027 COMPLETE CBC AUTOMATED: CPT

## 2021-03-23 PROCEDURE — 99024 POSTOP FOLLOW-UP VISIT: CPT | Performed by: NURSE PRACTITIONER

## 2021-03-23 PROCEDURE — 80048 BASIC METABOLIC PNL TOTAL CA: CPT

## 2021-03-23 PROCEDURE — 74011250637 HC RX REV CODE- 250/637: Performed by: SURGERY

## 2021-03-23 PROCEDURE — 74011000250 HC RX REV CODE- 250: Performed by: SURGERY

## 2021-03-23 PROCEDURE — 36415 COLL VENOUS BLD VENIPUNCTURE: CPT

## 2021-03-23 PROCEDURE — 74011250636 HC RX REV CODE- 250/636: Performed by: SURGERY

## 2021-03-23 RX ORDER — ONDANSETRON 4 MG/1
4 TABLET, ORALLY DISINTEGRATING ORAL
Qty: 15 TAB | Refills: 0 | Status: SHIPPED | OUTPATIENT
Start: 2021-03-23

## 2021-03-23 RX ORDER — ACETAMINOPHEN 325 MG/1
650 TABLET ORAL EVERY 6 HOURS
Qty: 80 TAB | Refills: 0 | Status: SHIPPED | OUTPATIENT
Start: 2021-03-23 | End: 2021-04-02

## 2021-03-23 RX ORDER — OXYCODONE HYDROCHLORIDE 5 MG/1
5 TABLET ORAL
Qty: 18 TAB | Refills: 0 | Status: SHIPPED | OUTPATIENT
Start: 2021-03-23 | End: 2021-03-26

## 2021-03-23 RX ADMIN — ACETAMINOPHEN 650 MG: 325 TABLET, FILM COATED ORAL at 09:41

## 2021-03-23 RX ADMIN — OXYCODONE HYDROCHLORIDE 5 MG: 5 TABLET ORAL at 14:39

## 2021-03-23 RX ADMIN — LEVOTHYROXINE SODIUM 125 MCG: 0.12 TABLET ORAL at 09:41

## 2021-03-23 RX ADMIN — ACETAMINOPHEN 650 MG: 325 TABLET, FILM COATED ORAL at 03:31

## 2021-03-23 RX ADMIN — KETOROLAC TROMETHAMINE 15 MG: 15 INJECTION, SOLUTION INTRAMUSCULAR; INTRAVENOUS at 05:22

## 2021-03-23 RX ADMIN — BUPROPION HYDROCHLORIDE 300 MG: 300 TABLET, FILM COATED, EXTENDED RELEASE ORAL at 09:41

## 2021-03-23 RX ADMIN — KETOROLAC TROMETHAMINE 15 MG: 15 INJECTION, SOLUTION INTRAMUSCULAR; INTRAVENOUS at 13:28

## 2021-03-23 RX ADMIN — SODIUM CHLORIDE, SODIUM LACTATE, POTASSIUM CHLORIDE, AND CALCIUM CHLORIDE 150 ML/HR: 600; 310; 30; 20 INJECTION, SOLUTION INTRAVENOUS at 05:23

## 2021-03-23 RX ADMIN — FAMOTIDINE 20 MG: 10 INJECTION INTRAVENOUS at 09:42

## 2021-03-23 RX ADMIN — ENOXAPARIN SODIUM 40 MG: 40 INJECTION SUBCUTANEOUS at 09:41

## 2021-03-23 NOTE — PROGRESS NOTES
Reason for Admission:  Morbid obesity ([facility] [address] [E66.01]  Essential hypertension [I10]                 RUR Score:    6            Plan for utilizing home health:    n/a                      Likelihood of Readmission:   LOW                         Transition of Care Plan:              Initial assessment completed with patient. Cognitive status of patient: oriented to time, place, person and situation. Face sheet information confirmed:  yes. The patient designates dad to participate in her discharge plan and to receive any needed information. This patient lives in a single family home with patient. Patient is able to navigate steps as needed. Prior to hospitalization, patient was considered to be independent with ADLs/IADLS : yes . Patient has a current ACP document on file: no      Healthcare Decision Maker:   Primary Decision Maker: Matthew Andrea - Child - [phone]    Secondary Decision Maker: odalys Chappell  and surgery update - Father - [phone]    Click here to complete [address] including selection of the Healthcare Decision Maker Relationship (ie \"Primary\")    The father will be available to transport patient home upon discharge. The patient already has none reported, and  medical equipment available in the home. Patient is not currently active with home health. Patient has not stayed in a skilled nursing facility or rehab. Was  stay within last 60 days : no. This patient is on dialysis :no    Currently, the discharge plan is Home. The patient states that she can obtain her medications from the pharmacy, and take her medications as directed. Patient's current insurance is [facility] [employer]  PCP Verified by CM:  Yes  Mode of Transport at Discharge: Self(dad)  Transition of Care Consult (CM Consult): Discharge Planning  Current Support Network: Own Home  Confirm Follow Up Transport: Self  The Plan for Transition of Care is Related to the Following Treatment Goals : home  Discharge Location  Discharge Placement: [address] Marroquin RN BSN  Outcomes Manager    Pager # [phone]

## 2021-03-23 NOTE — DISCHARGE SUMMARY
Bariatric Surgery Discharge Progress Note    Admission Date: 3/22/2021    Discharge Date: 3/23/2021    PREOPERATIVE DIAGNOSIS: Clinically severe obesity, body mass index of 47,   comorbidities of hypertension, stress urinary incontinence. POSTOPERATIVE DIAGNOSIS: Clinically severe obesity, body mass index of 47,   comorbidities of hypertension, stress urinary incontinence  PROCEDURES: Laparoscopic Phyllis-en-Y gastric bypass with 181 cm retrocolic   retrogastric Phyllis limb, 40 cm biliopancreatic limb, 15 ml    tubularized gastric pouch applied to the lesser curvature of stomach    Postop Complications: none    Hospital Course:  Patient was admitted on 3/22/2021 for scheduled bariatric surgery. Operation was without significant complication. Patient admitted to the floor postoperatively, monitored as per protocol. Diet sequentially advanced beginning POD 1, pain medications transitioned to oral during the hospital course. Currently the patient is afebrile, vital signs stable, tolerating a clear liquid diet with protein supplementation, voiding spontaneously, ambulatory with adequate pain control with oral medications and clear surgical sites without evidence of infection. Discharge Diet:  Clear Liquid Bariatric Diet for 7 days, then soft moist protein diet for 5 weeks    Discharge Medications:  Current Discharge Medication List      START taking these medications    Details   acetaminophen (TYLENOL) 325 mg tablet Take 2 Tabs by mouth every six (6) hours for 10 days. Indications: pain  Qty: 80 Tab, Refills: 0      oxyCODONE IR (ROXICODONE) 5 mg immediate release tablet Take 1 Tab by mouth every four (4) hours as needed for Pain for up to 3 days. Max Daily Amount: 30 mg.  Qty: 18 Tab, Refills: 0    Associated Diagnoses: Post-op pain      ondansetron (ZOFRAN ODT) 4 mg disintegrating tablet Take 1 Tab by mouth every eight (8) hours as needed for Nausea or Vomiting.  Indications: prevent nausea and vomiting after surgery  Qty: 15 Tab, Refills: 0         CONTINUE these medications which have NOT CHANGED    Details   enoxaparin (Lovenox) 40 mg/0.4 mL 0.4 mL by SubCUTAneous route daily for 7 days. Qty: 7 Syringe, Refills: 0    Comments: surg 3/22      buPROPion XL (WELLBUTRIN XL) 300 mg XL tablet       cyanocobalamin 1,000 mcg tablet TAKE 1 TABLET BY MOUTH EVERY DAY      levothyroxine (SYNTHROID) 112 mcg tablet Take 125 mcg by mouth Daily (before breakfast). !! ferrous sulfate 325 mg (65 mg iron) tablet Take 1 Tab by mouth Daily (before breakfast). Qty: 90 Tab, Refills: 1      !! ferrous sulfate 325 mg (65 mg iron) tablet Take 325 mg by mouth daily. calcium-cholecalciferol, d3, (CALCIUM 600 + D) 600-125 mg-unit tab Take  by mouth. !! - Potential duplicate medications found. Please discuss with provider.       STOP taking these medications       phentermine 37.5 mg capsule Comments:   Reason for Stopping:         topiramate (TOPAMAX) 50 mg tablet Comments:   Reason for Stopping:         prenatal vit-calcium-iron-fa (Prenatal Plus, calcium carb,) 27 mg iron- 1 mg tab Comments:   Reason for Stopping:                 Bariatric Chewable vitamins, 2 orally daily for life  Calcium Citrate 1500 mg orally daily for life  Vitamin B12 1000 micrograms sublingual daily for life  Vitamin D3 5,000 units orally daily for life   Vitamin B1 100 mg orally daily for life    Discharge disposition: home    Discharge condition: stable      Local wound care with daily showers, keep wounds clean and dry     Activity: as desired, no lifting, pushing, or pulling greater than 15lbs or situps for 30 days     Special Instructions:            - No driving until activity is not influenced by incisional pain and off narcotics            - No bath or hot tub until wounds are healed            - Check pulse and temperature twice daily for 10 days            - Notify [employer] for a Temp >100.5 or Pulse>115 Follow-up with your surgeon in 2 weeks, call office for appointment [phone] [phone] [address] [facility])

## 2021-03-23 NOTE — ROUTINE PROCESS
3/23/2021 
0730 End of Shift Note Bedside and verbal shift change report given to Germán Reilly RN (On coming nurse) by Amaya Rodriguez RN (Off going nurse). Report included the following information:  
   --Procedure Summary 
   --MAR, 
   --Recent Results --Med Rec Status SBAR Recommendations:  
 
Issues for Provider to address Activity This Shift 
 
 [] Bed Rest Order 
 [] Refused 
 [] Dangled  
 [] TDWB Ambulating: 
   [x] Bathroom [] BSC [x] Room/Hallway Up in Chair for meals []Yes [] No  
Voiding       [x] Yes  [] No 
Gordon          [] Yes  [] No 
Incontinent [] Yes  [] No 
 
DUE TO VOID POUR        [] Yes [] No 
Purewick    [] Yes [] No 
New Onset [] Yes [] No Straight Cath   []Yes  [] No 
Condom Cath  [] Yes [] No 
MD Called      [] Yes  [] No  
Blood Sugars Managed []Yes [x] No   
Bowels Moved [] Yes [x] No 
 
Incontinent     [] Yes [] No Passed Gas []Yes [x] No 
 
New Onset  []Yes [] No 
  
 
 MD Called []Yes  [] No 
  
CHG Bath Done Before Surgery After Surgery  
  
[] Yes  [x] No 
[] Yes  [x] No   
  
Drain Removed [] Yes  [] No [x] N/A Dressing Changed [] Yes   [x] No [] N/A Nausea/Vomiting [] Yes   [x] No    
Ice Packs Changed [x] Yes   [] No  [] N/A Incentive Spirometer  [x] Yes  [] No     
SCD Pumps On Ankle Pumping  [x] Yes   [] No  
  
[x] Yes   [] No    
  
Telemetry Monitoring [] Yes   [x] No

## 2021-03-23 NOTE — PROGRESS NOTES
Surgery Progress Note    3/23/2021    Admit Date: 3/22/2021    Subjective:     Patient has complaints of none Pain is controlled with current plan. Patient has been ambulating in halls. She reports no nausea and no vomiting. Bowel Movements: None    Objective:     Patient Vitals for the past 24 hrs:   Temp Pulse Resp BP SpO2   03/23/21 0754 98.4 °F (36.9 °C) 79 16 99/60 98 %   03/23/21 0329 97.5 °F (36.4 °C) 71 16 (!) 149/84 96 %   03/22/21 2354 97.1 °F (36.2 °C) 63 16 111/70 99 %   03/22/21 2044 97.1 °F (36.2 °C) 83 18 127/80 97 %   03/22/21 1826 97 °F (36.1 °C) 94 16 117/77 100 %   03/22/21 1630 97 °F (36.1 °C) 76 16 125/80 98 %   03/22/21 1552  64 14 138/71 96 %   03/22/21 1549 97.4 °F (36.3 °C) 95 18  96 %   03/22/21 1542  65 14 135/71 97 %   03/22/21 1532  81  126/78 95 %   03/22/21 1522  64  135/76 94 %   03/22/21 1512  68 12 (!) 142/71 96 %   03/22/21 1502  70 12 (!) 142/76 95 %   03/22/21 1452  81 12 133/79 99 %   03/22/21 1442  78 14 (!) 142/78 94 %   03/22/21 1432  77 16 129/78 (!) 87 %   03/22/21 1423  79 19 98/85 96 %   03/22/21 1412  73 15 (!) 142/80 100 %   03/22/21 1404 97.7 °F (36.5 °C) 78 12 (!) 141/79 99 %   03/22/21 1403     99 %   03/22/21 1402 97.7 °F (36.5 °C) 75 15 (!) 141/79 99 %   03/22/21 1103 97.9 °F (36.6 °C) 65 20 116/72 96 %     Date 03/22/21 0700 - 03/23/21 0659 03/23/21 0700 - 03/24/21 0659   Shift [id number] 1900-0659 24 Hour Total 3581-5496 0009-5146 24 Hour Total   INTAKE   P.O.  120 120 120  120     P. O.  120 120 120  120   I. V.(mL/kg/hr) 1600(1.2) 2110(1.6) 3710(1.4)        Volume (lactated Ringers infusion)  2110 2110        Volume (lactated Ringers infusion) 1600  1600      Shift Total(mL/kg) 1600(14.3) 2230(19.9) 3830(34.2) 120(1.1)  120(1.1)   OUTPUT   Urine(mL/kg/hr) 50(0) 850(0.6) 900(0.3)        Urine Voided  850 850        Urine Output 50  50      Shift Total(mL/kg) 50(0.4) 850(7.6) 900(8)      NET 1550 1380 2930 120  120   Weight (kg) 112 112 112 112 112 112       EXAM: GENERAL: alert, pleasant, no distress   HEART: regular rate and rhythm   LUNGS: clear to auscultation   ABDOMEN:  Soft, obese, non tender, non distended,  incisions clean, dry, no erythema or drainage   EXTREMITIES: warm, well perfused    Data Review    Recent Results (from the past 24 hour(s))   HCG URINE, QL    Collection Time: 03/22/21 10:35 AM   Result Value Ref Range    HCG urine, QL Negative NEG     DRUG SCREEN, URINE    Collection Time: 03/22/21 10:35 AM   Result Value Ref Range    BENZODIAZEPINES Negative NEG      BARBITURATES Negative NEG      THC (TH-CANNABINOL) Negative NEG      OPIATES Negative NEG      PCP(PHENCYCLIDINE) Negative NEG      COCAINE Negative NEG      AMPHETAMINES Negative NEG      METHADONE Negative NEG      HDSCOM (NOTE)    HCG URINE, QL. - POC    Collection Time: 03/22/21 10:46 AM   Result Value Ref Range    Pregnancy test,urine (POC) Negative NEG     GLUCOSE, POC    Collection Time: 03/22/21  3:55 PM   Result Value Ref Range    Glucose (POC) 121 (H) 70 - 110 mg/dL   CBC W/O DIFF    Collection Time: 03/23/21  3:39 AM   Result Value Ref Range    WBC 9.9 4.6 - 13.2 K/uL    RBC 4.08 (L) 4.20 - 5.30 M/uL    HGB 12.3 12.0 - 16.0 g/dL    HCT 37.4 35.0 - 45.0 %    MCV 91.7 74.0 - 97.0 FL    MCH 30.1 24.0 - 34.0 PG    MCHC 32.9 31.0 - 37.0 g/dL    RDW 13.7 11.6 - 14.5 %    PLATELET 112 197 - 219 K/uL    MPV 10.1 9.2 - 11.5 FL   METABOLIC PANEL, BASIC    Collection Time: 03/23/21  3:39 AM   Result Value Ref Range    Sodium 142 136 - 145 mmol/L    Potassium 4.1 3.5 - 5.5 mmol/L    Chloride 108 100 - 111 mmol/L    CO2 26 21 - 32 mmol/L    Anion gap 8 3.0 - 18 mmol/L    Glucose 92 74 - 99 mg/dL    BUN 8 7.0 - 18 MG/DL    Creatinine 0.58 (L) 0.6 - 1.3 MG/DL    BUN/Creatinine ratio 14 12 - 20      GFR est AA >60 >60 ml/min/1.73m2    GFR est non-AA >60 >60 ml/min/1.73m2    Calcium 8.2 (L) 8.5 - 10.1 MG/DL       Assessment:   Adriana Hdez is a 38 y.o. female,  day 1 status post Laparoscopic Phyllis-en-Y gastric bypass with 492 cm retrocolic   retrogastric Phyllis limb, 40 cm biliopancreatic limb, 15 ml    tubularized gastric pouch applied to the lesser curvature of stomach.     Condition: good    Plan:   -Ambulate every four hours  - Pain managed prior to d/c   -Nausea managed prior to d/c   -Advance to Clear liquid Gastric Bypass Diet, if able to tolerate clear liquid diet (with pro shake) 4oz per hour for 3 hours prior to d/c    If patient continue to progress d/c home later today     Lore Ayala NP  10:23 AM  3/23/2021

## 2021-03-23 NOTE — PROGRESS NOTES
Problem: Pain  Goal: *Control of Pain  Outcome: Resolved/Met     Problem: Patient Education: Go to Patient Education Activity  Goal: Patient/Family Education  Outcome: Resolved/Met     Problem: Falls - Risk of  Goal: *Absence of Falls  Description: Document Cherie Fall Risk and appropriate interventions in the flowsheet.   Outcome: Resolved/Met  Note: Fall Risk Interventions:            Medication Interventions: Teach patient to arise slowly                   Problem: Patient Education: Go to Patient Education Activity  Goal: Patient/Family Education  Outcome: Resolved/Met     Problem: Laparoscopic Gastric Bypass:Post-Op Day 1  Goal: Activity/Safety  Outcome: Resolved/Met  Goal: Diagnostic Test/Procedures  Outcome: Resolved/Met  Goal: Nutrition/Diet  Outcome: Resolved/Met  Goal: Discharge Planning  Outcome: Resolved/Met  Goal: Medications  Outcome: Resolved/Met  Goal: Respiratory  Outcome: Resolved/Met  Goal: Treatments/Interventions/Procedures  Outcome: Resolved/Met  Goal: Psychosocial  Outcome: Resolved/Met  Goal: *No signs and symptoms of infection or wound complications  Outcome: Resolved/Met  Goal: *Optimal pain control at patient's stated goal  Outcome: Resolved/Met  Goal: *Adequate urinary output (equal to or greater than 30 milliliters/hour)  Outcome: Resolved/Met  Goal: *Hemodynamically stable  Outcome: Resolved/Met  Goal: *Tolerating diet  Outcome: Resolved/Met  Goal: *Demonstrates progressive activity  Outcome: Resolved/Met  Goal: *Absence of signs and symptoms of DVT  Outcome: Resolved/Met  Goal: *Labs within defined limits  Outcome: Resolved/Met  Goal: *Oxygen saturation within defined limits  Outcome: Resolved/Met  Goal: *Upper GI series/No leak identified  Outcome: Resolved/Met

## 2021-03-23 NOTE — ROUTINE PROCESS
I have reviewed discharge instructions and medications with the patient. The patient verbalized understanding. Patient armband removed and shredded Dressings C/D/I. No questions or concerns verbalized. Patient was educated on lovenox injections. 1. Wash and dry hands 2. Sit or lie in comfortable position 3. Choose an area around love handles 3 inches away from [facility] 4. Clean site with alcohol and let dry 5. Remove needle cap pull straight out 6. With your other and pinch an inch of the cleanses area and insert full length of needle straight in 90 degree 7. Press the plunger with your thumb slowly until syringe is empty. Pull needle out and point away from you. Push down on plunger to activate the safety shield. Push hard 8. Place the syringe in a plastic bottle and dispose in trash. 9. Rotate sites 10. Do not remove air from syringe before injection 11. Do not rub the area Return demonstration performed.

## 2021-03-23 NOTE — PROGRESS NOTES
Patient was educated on all discharge instructions below. He/she understood and was provided a copy. He/she knows who to call for any issues post discharge. Hydration  Hydration is your NUMBER ONE priority. Dehydration is the most common reason for readmission to the hospital. Dehydration occurs when  your body does not get enough fluid to keep it functioning at its best. Your body also requires fluid  to burn its stored fat calories for energy. Carry a bottle of water with you all day, even when you are away from home; remind yourself to  drink even if you dont feel thirsty. Drinking 64 ounces of fluid is your daily goal. You can tell if  youre getting enough fluid if youre making clear, light-colored urine five to 10 times per day. Signs of dehydration can be thirst, headache, hard stools or dizziness upon sitting or standing up. You should contact your surgeons office if you are unable to drink enough fluid to stay hydrated. [address] [place] [address] Street after Surgery   No lifting over 15 pounds for four weeks.  No driving while taking the pain medication (about seven to 10 days).  No tub baths, swimming or hot tubs until incisions are healed (about two weeks).  You may shower. Clean incisions daily /gently with soap and check incisions for signs of infection:   Redness around incision.  Swelling at site.  Drainage with an foul odor (pus).  Increase tenderness around incision.  Take your temperature and resting pulse in the morning and evening. Record on tracking form  given to you. Call if your temperature is greater than 101 or your pulse rate is greater than 115.  Please contact your surgeon if you are having excessive abdominal pain (that lasts longer than  four hours and does not improve with prescribed pain medication), vomiting or shortness of breath.  Get up and move  do not sit in one place for more than an hour.    You need to WALK (EXERCISE) for 30 minutes per day.  Walking around your house does not count.  Bike, treadmill and elliptical are OK.  NO weight lifting or sit ups.  If constipated take an adult dose of Miralax (available over the counter). Contact the doctors  office if Miralax doesnt help.  You may swallow pills starting the day after surgery as long as they fit inside this [place]:   Continue to use your incentive spirometer (breathing machine) for the next couple of weeks to  help prevent pneumonia. [address]  Temperature/Heart Rate Log  Take your temperature and heart rate (pulse) twice a day for 14 days. Take both in the morning and  evening at about the same time each day (when you wake up and before you go to bed when you  are relaxed). Please contact your doctors office if your:   Temperature is higher than 101 degrees.  Heart rate (pulse) is higher than 115 beats per minute  (normal heart rate is 60 to 100 beats per minute). How to Take Your Heart Rate (Pulse)   Turn your left hand so that your palm is face-up.  With the index and middle fingers of your right  hand, draw a line from the base of your thumb to  just below the crease in your wrist. Your fingers  should nestle just to the left of the large tendon that  pops up when you bend your wrist toward you.  Dont press too hard, that will make the pulse go  away. Use gentle pressure.  Wait. It can take several seconds  and several micro-adjustments in the placement of your two  fingers on your wrist  to find your pulse. Just keep moving your fingers down or up your wrist  in small increments (and pausing for a few seconds) until you find it. How to Take Your Pulse Rate   Find a watch with a second hand and place it on your right wrist or on the table next  to your left hand.  After finding your pulse, count the number of beats for 20 seconds.    Multiply by three to get your heart rate, or beats per minute  (or just count for 60 seconds for a math-free option).  Normal, resting heart rate is about 60 to 100 beats per minute.  [address] [facility]  Lovenox Self Injection Guide  Prepare  Step 1: Wash and dry your hands thoroughly. Step 2: Sit or lie in a comfortable position and choose an area  on the right or left side of the abdomen at least two inches away  from the belly button. Step 3: Clean the injection site with an alcohol swab and let dry. Inject  Step 4: Remove the needle cap by pulling it straight off the syringe and  discard it in a sharps . Step 5: With your other hand, pinch an inch of the cleansed area to  make a fold in the skin. Insert the full length of the needle straight  down  at a 90? angle  into the fold of skin.  [address] [facility]  Step 6: Press the plunger with your thumb until the syringe is empty. Then pull the needle straight out and release the skin fold. Dispose  Step 7: Point the needle down and away from yourself and others,  and then push down on the plunger to activate the safety shield. Step 8: Place the used syringe in the sharps . Do NOT expel the air bubble from the syringe before the injection. Administration should be alternated between the left and right abdominal wall. The whole length  of the needle should be introduced into a skin fold held between the thumb and forefinger; the  skin fold should be held throughout the injection. To minimize bruising, do not rub the injection  site after completion of the injection. Questions about LOVENOX? Call [phone]   52   PATIENT GUIDE TO [address] [place] [address]  9. DIET AND LIFESTYLE  Key Diet Principles Following Bariatric Surgery   Begin each meal with soft moist high protein foods (i.e. chicken, turkey, yogurt, tuna, eggs,  cottage cheese, other fish and seafood).    Consume a minimum of 64 ounces of fluid each day to prevent dehydration. No straws.  No food and fluid together. Stop drinking 30 minutes before a meal. You may begin fluids again  30 minutes after you finish a meal.   Eat very slowly and chew all foods completely.  Keep portions small.  No simple sugars or high fat foods. No carbonated beverages. No caffeine.  Eat three meals per day. No skipping. Avoid snacking between meals.  No alcohol. No smoking.  Two Flintstones Complete Chewable vitamins each day. Take one in the morning and one at night.  1,500 milligrams Calcium Citrate per day in separate dosages.  Vitamin D 3: 5,000 IU taken per day.  Vitamin B-12: Take 1,000 micrograms sublingually daily.  Iron: 60 milligrams per day from [facility].  Protein supplements of your choice. Must be low sugar (0 to 3 grams), low fat (0 to 3 grams) and  provide at least 35 to 40 grams of protein each day. You need 60 to 70 grams of protein (food  and supplements) each day.  Minimum of 30 minutes of physical activity daily. Do not jeannie NSAIDS . Do not take Steroids without your surgeons permission.  [address]  Your Priorities After Surgery  ? Fluid: 64 ounces of fluid per day. ? Protein: 60 to 70 grams of protein per day. ? Walk every day. Clear Liquid Diet  One week of clear liquids: minimum of 64 ounces of fluid per day. Fluid:   Zero calorie liquids.  No caffeine.  No carbonation.  No sugary drinks.  No alcohol.  No straws. Food   Protein drinks.  Less than 3 grams of sugar and  3 grams of fat per serving.  Protein drink should provide you with  60 to 70 grams of protein. Soft Protein Diet  Five weeks of soft protein (1 ounce for soft protein, 3 ounces of yogurt/cottage cheese). Three meals per day and 1 protein shake.  Protein shakes should provide you with 30 grams of  protein on the soft protein diet.  Slow transition:   First week on soft protein diet  focus on yogurt, cottage cheese, eggs, vegetarian refried beans,  black beans, kidney beans and white beans. (NO BAKED BEANS.)   Second through fourth week on soft protein diet  focus on yogurt, cottage cheese, eggs,  canned tuna, canned chicken, tilapia and fish (needs to be soft enough to be cut up with a fork).  Fifth week on soft protein diet  focus on yogurt, cottage cheese, eggs, canned tuna, canned  chicken, tilapia, fish, salmon, chicken breast or turkey. Fluid is your #1 Priority! Continue clear liquids between meals. You will need 64 ounces of fluid per day. Fluids that you can have include:   Water.  Zero calorie liquids. You will need to sip throughout the day and should therefore have a water bottle with you at all  times! No liquids with your meals. Stop 30 minutes before a meal and wait 30 minutes after a meal.  No straws. Zero calorie liquids. No caffeine. No carbonation. No sugary drinks. No alcohol.  [facility]  Protein  You will need 60 to 70 grams of protein per day.  60 to 70 grams of protein shakes when on the clear liquid diet (two to three shakes per day).  30 to 50 grams of protein shakes when on the soft protein diet (one shake per day). Eat Three Times Per Day  You will need to eat three times per day. My planned times are:  _________________________________________________________  _________________________________________________________  _________________________________________________________  Nausea, Vomiting, Stomach Pain  If you have problems with nausea, vomiting or stomach pain, try:   Eating slowly: 20 to 30 minutes per meal.   Chewing food thoroughly: 20 to 30 chews before food is swallowed.  Small portions: measure portions in medicine cup.  Stopping before feeling full.    AVOIDING SUGAR and FRIED FOOD: sugar will cause dumping syndrome and lead to weight gain. Exercise  I will need to get a minimum of 30 minutes of exercise per day or 150 minutes of exercise per week.  Walking, swimming, biking or elliptical.   Find something you enjoy! Vitamins  After surgery, you will need to take the following vitamins for the rest of your life  FOREVER.  Vitamin D 3: 5,000 IU per day.  Calcium Citrate: 1,500 milligrams, taken separately.  Flintstones Complete: two per day, taken separately.  Sublingual Vitamin B-12: 1,000 micrograms daily.  Iron for menstruating women or patients with a history of low iron: 65 milligrams daily. We recommend going to www.[facility]Diplopiaage. [employer] and purchasing iron from there. The lemon-lime has 60 milligrams. This iron is better absorbed than over-the-counter iron.  [address]  Clear Liquid Log  Getting your fluid in is top priority during this week. Fluids (MINIUM of 64 ounces per day):  ? 8 oz. ? 8 oz. ? 8 oz. ? 8 oz. ? 8 oz. ? 8 oz. ? 8 oz. ? 8 oz. ? 8 oz. ? 8 oz. ? 8 oz. ? 8 oz. Flintstones Complete Chewable: ? a.m. ? p.m. Calcium Citrate (1,500 milligrams/day):  Pill form  ? Two crushed pills (morning) ? Two crushed pills (afternoon) ? Two crushed pills (evening)  OR Upcal D (powder)  ? One pack/scoop ? One pack/scoop ? One pack/scoop  OR Celebrate Chewable Vitamins (500 mg each) or Bariat[facility] Advantage Chewables (500 mg)  ? One chewable (morning) ? One chewable (afternoon) ? One chewable (evening)  OR Liquid Calcium Citrate  ? 1 tbsp. Calcium Citrate ? 1 tbsp. Calcium Citrate ? 1 tbsp. Calcium Citrate  Vitamin D3: ? 5,000 IU daily. Vitamin B-12: ? 1,000 micrograms per day. Iron (menstruating women or patients with a history of low iron):  ? 60 milligrams per day from [facility]. Protein drinks (protein drinks should be under 3 grams of sugar and 3 grams of fat). Protein shake (60 grams per day): ? a.m. ? p.m.  Exercise: ? 30 to 40 minutes per day.  48   PATIENT GUIDE TO BARIATRIC [address]  Bariatric Soft and Moist Diet Shopping List   alcium Citrate (1,500 milligrams/day):  Pill form  ? Two crushed pills (morning) ? Two crushed pills (afternoon) ? Two crushed pills (evening)  OR Upcal D (powder)  ? One pack/scoop ? One pack/scoop ? One pack/scoop  OR Celebrate Chewable Vitamins (500 mg each) or [facility]ariat[facility] Advantage Chewables (500 mg)  ? One chewable (morning) ? One chewable (afternoon) ? One chewable (evening)  OR Liquid Calcium Citrate  ? 1 tbsp. Calcium Citrate ? 1 tbsp. Calcium Citrate ? 1 tbsp. Calcium Citrate  Vitamin D3: ? 5,000 IU daily  Vitamin B-12: ? 1,000 micrograms per day  Iron (menstruating women or  patients with a history of low iron):  ? 60 milligrams per day  from [facility]  Protein drinks (protein drinks should be under  3 grams of sugar and 3 grams of fat). Protein shake (30 to 40 grams per day):  ? a.m. ? p.m. Exercise: ? 30 to 40 minutes per  Educated on Diet Progression    Farshad Meneses Gastric Bypass and Sleeve Dietary Progression    Patient Name:   Date of Surgery: Ice Chips start once admitted on floor. Begin Bariatric Clear Liquid Diet on:     Clear Liquid Diet: 64 oz. of fluid per day  o Low calorie, low sugar, non-carbonated beverages  - Water, Crystal Light, Propel Water, Sugar Free Jell-O, Sugar Free Popsicles, Bouillon  - Start protein supplement during this stage. (60-70 grams per day)  - Getting your fluid in and staying hydrated is your #1 priority! - The clear liquid diet will last for 7 days. Begin Bariatric Soft and Moist on: 3/30  - This stage of the diet will last for 5 weeks, unless otherwise instructed by your surgeon. - Begin:  1 week post-op   - End:  6 weeks post-op (or when you follow up with the Registered Dietitian)    - Soft, moist, high protein foods: 3 meals per day plus protein supplements.   o Portions should emphasize on soft protein. o Portions will be a MAXIMUM of:  o  1 ounce of solid food  o  2-3 ounces of cottage cheese and yogurt. o Protein supplements should be between meals and provide 30-40 grams per day during soft protein diet. o Continue to get 64 ounces of fluid in per day. - Protein foods that are ok on the Soft and Moist Diet include:  o Slow transition:  o 1st week on soft protein should focus on: Yogurt, cottage cheese, eggs  o 2nd -4th  week on soft protein diet should focus on: yogurt, cottage cheese, eggs, canned tuna, canned chicken, tilapia, fish (needs to be soft enough to be cut up with a fork)  o 5th week on soft protein diet should focus on: Yogurt, cottage cheese, eggs, canned tuna, canned chicken, tilapia, fish, salmon, chicken breast, or turkey. Remember to continue to get 64 ounces of fluid daily on ALL Stages. To be advanced to Bariatric Maintenance Stage of the bariatric diet, follow up with the dietitian 6 weeks post-op, around:         For any additional questions, please refer to your blue binder that was provided to you at the start of the bariatric program.

## 2021-03-23 NOTE — PROGRESS NOTES
Reinforced education below  Patient was educated on progression of diet this AM. Goal of 4 ounces per hour with one ounce being protein was clearly understood. Patient was instructed to go slow with small sips to reach goal. Patient given a report card to record intake. Education completed on I.S use and to ambulate in farmer at least 4 times. Will follow up and check progression.

## 2021-03-23 NOTE — DISCHARGE INSTRUCTIONS
Patient was educated on all discharge instructions below. He/she understood and was provided a copy. He/she knows who to call for any issues post discharge. Hydration  Hydration is your NUMBER ONE priority. Dehydration is the most common reason for readmission to the hospital. Dehydration occurs when  your body does not get enough fluid to keep it functioning at its best. Your body also requires fluid  to burn its stored fat calories for energy. Carry a bottle of water with you all day, even when you are away from home; remind yourself to  drink even if you dont feel thirsty. Drinking 64 ounces of fluid is your daily goal. You can tell if  youre getting enough fluid if youre making clear, light-colored urine five to 10 times per day. Signs of dehydration can be thirst, headache, hard stools or dizziness upon sitting or standing up. You should contact your surgeons office if you are unable to drink enough fluid to stay hydrated. --   [address] after Surgery   No lifting over 15 pounds for four weeks.  No driving while taking the pain medication (about seven to 10 days).  No tub baths, swimming or hot tubs until incisions are healed (about two weeks).  You may shower. Clean incisions daily /gently with soap and check incisions for signs of infection:  -- Redness around incision. -- Swelling at site. -- Drainage with an foul odor (pus). -- Increase tenderness around incision.  Take your temperature and resting pulse in the morning and evening. Record on tracking form  given to you. Call if your temperature is greater than 101 or your pulse rate is greater than 115.  Please contact your surgeon if you are having excessive abdominal pain (that lasts longer than  four hours and does not improve with prescribed pain medication), vomiting or shortness of breath.  Get up and move -- do not sit in one place for more than an hour.    You need to WALK (EXERCISE) for 30 minutes per day. -- Walking around your house does not count. -- Bike, treadmill and elliptical are OK. -- NO weight lifting or sit ups.  If constipated take an adult dose of Miralax (available over the counter). Contact the doctors  office if Miralax doesnt help.  You may swallow pills starting the day after surgery as long as they fit inside this [place]:   Continue to use your incentive spirometer (breathing machine) for the next couple of weeks to  help prevent pneumonia. [address]  Temperature/Heart Rate Log  Take your temperature and heart rate (pulse) twice a day for 14 days. Take both in the morning and  evening at about the same time each day (when you wake up and before you go to bed when you  are relaxed). Please contact your doctors office if your:   Temperature is higher than 101 degrees.  Heart rate (pulse) is higher than 115 beats per minute  (normal heart rate is 60 to 100 beats per minute). How to Take Your Heart Rate (Pulse)   Turn your left hand so that your palm is face-up.  With the index and middle fingers of your right  hand, draw a line from the base of your thumb to  just below the crease in your wrist. Your fingers  should nestle just to the left of the large tendon that  pops up when you bend your wrist toward you.  Dont press too hard, that will make the pulse go  away. Use gentle pressure.  Wait. It can take several seconds -- and several micro-adjustments in the placement of your two  fingers on your wrist -- to find your pulse. Just keep moving your fingers down or up your wrist  in small increments (and pausing for a few seconds) until you find it. How to Take Your Pulse Rate   Find a watch with a second hand and place it on your right wrist or on the table next  to your left hand.  After finding your pulse, count the number of beats for 20 seconds.    Multiply by three to get your heart rate, or beats per minute  (or just count for 60 seconds for a math-free option).  Normal, resting heart rate is about 60 to 100 beats per minute. -- 55 --  PATIENT GUIDE TO [address]  Lovenox Self Injection Guide  Prepare  Step 1: Wash and dry your hands thoroughly. Step 2: Sit or lie in a comfortable position and choose an area  on the right or left side of the abdomen at least two inches away  from the belly button. Step 3: Clean the injection site with an alcohol swab and let dry. Inject  Step 4: Remove the needle cap by pulling it straight off the syringe and  discard it in a sharps . Step 5: With your other hand, pinch an inch of the cleansed area to  make a fold in the skin. Insert the full length of the needle straight  down -- at a 90? angle -- into the fold of skin. -- 50 --  PATIENT GUIDE TO [address]  Step 6: Press the plunger with your thumb until the syringe is empty. Then pull the needle straight out and release the skin fold. Dispose  Step 7: Point the needle down and away from yourself and others,  and then push down on the plunger to activate the safety shield. Step 8: Place the used syringe in the sharps . Do NOT expel the air bubble from the syringe before the injection. Administration should be alternated between the left and right abdominal wall. The whole length  of the needle should be introduced into a skin fold held between the thumb and forefinger; the  skin fold should be held throughout the injection. To minimize bruising, do not rub the injection  site after completion of the injection. Questions about LOVENOX? Call [phone]  -- 49 --  PATIENT GUIDE TO [address] [place] [address]  9.  DIET AND LIFESTYLE  Key Diet Principles Following Bariatric Surgery   Begin each meal with soft moist high protein foods (i.e. chicken, turkey, yogurt, tuna, eggs,  cottage cheese, other fish and seafood).  Consume a minimum of 64 ounces of fluid each day to prevent dehydration. No straws.  No food and fluid together. Stop drinking 30 minutes before a meal. You may begin fluids again  30 minutes after you finish a meal.   Eat very slowly and chew all foods completely.  Keep portions small.  No simple sugars or high fat foods. No carbonated beverages. No caffeine.  Eat three meals per day. No skipping. Avoid snacking between meals.  No alcohol. No smoking.  Two Flintstones Complete Chewable vitamins each day. Take one in the morning and one at night.  1,500 milligrams Calcium Citrate per day in separate dosages.  Vitamin D 3: 5,000 IU taken per day.  Vitamin B-12: Take 1,000 micrograms sublingually daily.  Iron: 60 milligrams per day from Bariatric Advantage.  Protein supplements of your choice. Must be low sugar (0 to 3 grams), low fat (0 to 3 grams) and  provide at least 35 to 40 grams of protein each day. You need 60 to 70 grams of protein (food  and supplements) each day.  Minimum of 30 minutes of physical activity daily. Do not jeannie NSAIDS . Do not take Steroids without your surgeons permission. -- 48 --  [address] [facility]  Your Priorities After Surgery  ? Fluid: 64 ounces of fluid per day. ? Protein: 60 to 70 grams of protein per day. ? Walk every day. Clear Liquid Diet  One week of clear liquids: minimum of 64 ounces of fluid per day. Fluid:   Zero calorie liquids.  No caffeine.  No carbonation.  No sugary drinks.  No alcohol.  No straws. Food   Protein drinks.  Less than 3 grams of sugar and  3 grams of fat per serving.  Protein drink should provide you with  60 to 70 grams of protein. Soft Protein Diet  Five weeks of soft protein (1 ounce for soft protein, 3 ounces of yogurt/cottage cheese). Three meals per day and 1 protein shake.  Protein shakes should provide you with 30 grams of  protein on the soft protein diet. Slow transition:   First week on soft protein diet -- focus on yogurt, cottage cheese, eggs, vegetarian refried beans,  black beans, kidney beans and white beans. (NO BAKED BEANS.)   Second through fourth week on soft protein diet -- focus on yogurt, cottage cheese, eggs,  canned tuna, canned chicken, tilapia and fish (needs to be soft enough to be cut up with a fork).  Fifth week on soft protein diet -- focus on yogurt, cottage cheese, eggs, canned tuna, canned  chicken, tilapia, fish, salmon, chicken breast or turkey. Fluid is your #1 Priority! Continue clear liquids between meals. You will need 64 ounces of fluid per day. Fluids that you can have include:   Water.  Zero calorie liquids. You will need to sip throughout the day and should therefore have a water bottle with you at all  times! No liquids with your meals. Stop 30 minutes before a meal and wait 30 minutes after a meal.  No straws. Zero calorie liquids. No caffeine. No carbonation. No sugary drinks. No alcohol. -- 46 --  60 [facility]  Protein  You will need 60 to 70 grams of protein per day.  60 to 70 grams of protein shakes when on the clear liquid diet (two to three shakes per day).  30 to 50 grams of protein shakes when on the soft protein diet (one shake per day). Eat Three Times Per Day  You will need to eat three times per day. My planned times are:  _________________________________________________________  _________________________________________________________  _________________________________________________________  Nausea, Vomiting, Stomach Pain  If you have problems with nausea, vomiting or stomach pain, try:   Eating slowly: 20 to 30 minutes per meal.   Chewing food thoroughly: 20 to 30 chews before food is swallowed.  Small portions: measure portions in medicine cup.  Stopping before feeling full.    AVOIDING SUGAR and FRIED FOOD: sugar will cause dumping syndrome and lead to weight gain. Exercise  I will need to get a minimum of 30 minutes of exercise per day or 150 minutes of exercise per week.  Walking, swimming, biking or elliptical.   Find something you enjoy! Vitamins  After surgery, you will need to take the following vitamins for the rest of your life -- FOREVER.  Vitamin D 3: 5,000 IU per day.  Calcium Citrate: 1,500 milligrams, taken separately.  Flintstones Complete: two per day, taken separately.  Sublingual Vitamin B-12: 1,000 micrograms daily.  Iron for menstruating women or patients with a history of low iron: 65 milligrams daily. We recommend going to www.bariatricadCinemaNowage. Immy and purchasing iron from there. The lemon-lime has 60 milligrams. This iron is better absorbed than over-the-counter iron. -- 46 --  PATIENT GUIDE TO BARIATRIC [address] [place] [address] Rd  Clear Liquid Log  Getting your fluid in is top priority during this week. Fluids (MINIUM of 64 ounces per day):  ? 8 oz. ? 8 oz. ? 8 oz. ? 8 oz. ? 8 oz. ? 8 oz. ? 8 oz. ? 8 oz. ? 8 oz. ? 8 oz. ? 8 oz. ? 8 oz. Flintstones Complete Chewable: ? a.m. ? p.m. Calcium Citrate (1,500 milligrams/day):  Pill form  ? Two crushed pills (morning) ? Two crushed pills (afternoon) ? Two crushed pills (evening)  OR Upcal D (powder)  ? One pack/scoop ? One pack/scoop ? One pack/scoop  OR Celebrate Chewable Vitamins (500 mg each) or Bariatric Advantage Chewables (500 mg)  ? One chewable (morning) ? One chewable (afternoon) ? One chewable (evening)  OR Liquid Calcium Citrate  ? 1 tbsp. Calcium Citrate ? 1 tbsp. Calcium Citrate ? 1 tbsp. Calcium Citrate  Vitamin D3: ? 5,000 IU daily. Vitamin B-12: ? 1,000 micrograms per day. Iron (menstruating women or patients with a history of low iron):  ? 60 milligrams per day from Bariatric Advantage. Protein drinks (protein drinks should be under 3 grams of sugar and 3 grams of fat).   Protein shake (60 grams per day): ? a.m. ? p.m. Exercise: ? 30 to 40 minutes per day. -- 48 --  PATIENT GUIDE TO BARIATRIC [address]  Bariatric Soft and Moist Diet Shopping List   alcium Citrate (1,500 milligrams/day):  Pill form  ? Two crushed pills (morning) ? Two crushed pills (afternoon) ? Two crushed pills (evening)  OR Upcal D (powder)  ? One pack/scoop ? One pack/scoop ? One pack/scoop  OR Celebrate Chewable Vitamins (500 mg each) or Bariatric Advantage Chewables (500 mg)  ? One chewable (morning) ? One chewable (afternoon) ? One chewable (evening)  OR Liquid Calcium Citrate  ? 1 tbsp. Calcium Citrate ? 1 tbsp. Calcium Citrate ? 1 tbsp. Calcium Citrate  Vitamin D3: ? 5,000 IU daily  Vitamin B-12: ? 1,000 micrograms per day  Iron (menstruating women or  patients with a history of low iron):  ? 60 milligrams per day  from Bariatric Advantage  Protein drinks (protein drinks should be under  3 grams of sugar and 3 grams of fat). Protein shake (30 to 40 grams per day):  ? a.m. ? p.m. Exercise: ? 30 to 40 minutes per  Educated on Diet Progression    [facility] Gastric Bypass and Sleeve Dietary Progression    Patient Name:   Date of Surgery: Ice Chips start once admitted on floor. Begin Bariatric Clear Liquid Diet on:     Clear Liquid Diet: 64 oz. of fluid per day  o Low calorie, low sugar, non-carbonated beverages  - Water, Crystal Light, Propel Water, Sugar Free Jell-O, Sugar Free Popsicles, Bouillon  - Start protein supplement during this stage. (60-70 grams per day)  - Getting your fluid in and staying hydrated is your #1 priority! - The clear liquid diet will last for 7 days. Begin Bariatric Soft and Moist on:   - This stage of the diet will last for 5 weeks, unless otherwise instructed by your surgeon.   - Begin:  1 week post-op   - End:  6 weeks post-op (or when you follow up with the Registered Dietitian)    - Soft, moist, high protein foods: 3 meals per day plus protein supplements. o   Portions should emphasize on soft protein. o Portions will be a MAXIMUM of:  o  1 ounce of solid food  o  2-3 ounces of cottage cheese and yogurt. o Protein supplements should be between meals and provide 30-40 grams per day during soft protein diet. o Continue to get 64 ounces of fluid in per day. - Protein foods that are ok on the Soft and Moist Diet include:  o Slow transition:  o 1st week on soft protein should focus on: Yogurt, cottage cheese, eggs  o 2nd -4th  week on soft protein diet should focus on: yogurt, cottage cheese, eggs, canned tuna, canned chicken, tilapia, fish (needs to be soft enough to be cut up with a fork)  o 5th week on soft protein diet should focus on: Yogurt, cottage cheese, eggs, canned tuna, canned chicken, tilapia, fish, salmon, chicken breast, or turkey. Remember to continue to get 64 ounces of fluid daily on ALL Stages. To be advanced to Bariatric Maintenance Stage of the bariatric diet, follow up with the dietitian 6 weeks post-op, around:         For any additional questions, please refer to your blue binder that was provided to you at the start of the bariatric program.

## 2021-03-29 ENCOUNTER — DOCUMENTATION ONLY (OUTPATIENT)
Dept: BARIATRICS/WEIGHT MGMT | Age: 41
End: 2021-03-29

## 2021-03-29 NOTE — PROGRESS NOTES
3/29/2021: Patient called RD inquiring about foods to consume on 1 week soft proteins diet. Discussed good protein sources and introducing foods slowly. Patient reported weight at 233 lbs currently. Patient is doing well, no issues w/ dumping, nausea or vomiting. Consuming 80 oz SF liquids daily, continuing w/ 3 protein shakes daily, and taking vitamins with ease. Patient reported she has taken magnesium citrate to help w/ going to the bathroom, only a few times. Will f/u at 6 week post op nutrition visit.      Ramesh Lambert RD

## 2021-04-01 ENCOUNTER — TELEPHONE (OUTPATIENT)
Dept: SURGERY | Age: 41
End: 2021-04-01

## 2021-04-01 NOTE — TELEPHONE ENCOUNTER
Pt called stating she had GBP 3/22/21. She wanted to inform Dr. Rj Contreras that she went to urgent care for an abscess on the back of her left kneed and they cultured it. The culture came back positive for staph infection and Serratia Marcescens. She said she is going to  antibiotics today sulfamethoxazole tmp ds tab 1 bid x 10 days. I informed Dr. Rj Contreras and relayed to the patient that he would inform hospital about infection and she was good to go to take the antibiotic. Pt verbalized understanding.

## 2021-04-05 ENCOUNTER — OFFICE VISIT (OUTPATIENT)
Dept: SURGERY | Age: 41
End: 2021-04-05
Payer: COMMERCIAL

## 2021-04-05 VITALS
HEART RATE: 83 BPM | SYSTOLIC BLOOD PRESSURE: 132 MMHG | RESPIRATION RATE: 20 BRPM | HEIGHT: 61 IN | BODY MASS INDEX: 42.67 KG/M2 | DIASTOLIC BLOOD PRESSURE: 74 MMHG | TEMPERATURE: 97 F | WEIGHT: 226 LBS

## 2021-04-05 DIAGNOSIS — K91.2 POSTOPERATIVE MALABSORPTION: Primary | ICD-10-CM

## 2021-04-05 PROCEDURE — 99024 POSTOP FOLLOW-UP VISIT: CPT | Performed by: SURGERY

## 2021-04-05 RX ORDER — BISMUTH SUBSALICYLATE 262 MG
1 TABLET,CHEWABLE ORAL DAILY
COMMUNITY

## 2021-04-05 NOTE — PROGRESS NOTES
Subjective:      Anusha Nunez is a 38 y.o. female is now 2 weeks status post laparoscopic gastric bypass surgery. The patient is on stage 3 diet. she  is able to drink 64 ounces daily. she  is consuming 2 protein supplements a day. she is exercising 30-90 minutes daily. she does not admit to vomiting, does not admit to dumping/diarrhea, has had constipation and is not using bulk laxatives to manage. The patient does admit to fatigue, does not admit to dizziness. Weight Loss Metrics 3/22/2021 3/12/2021 3/4/2021 3/4/2021 2020 2020 2020   Pre op / Initial Wt - - 247 - 251.6 - 260   Today's Wt - 247 lb - 247 lb - 251 lb 9.6 oz -   BMI 48.24 kg/m2 - - 46.67 kg/m2 - 47.54 kg/m2 -   Ideal Body Wt - - 119 - 122 - 122   Excess Body Wt - - 128 - 129.6 - 138   Goal Wt - - 145 - 148 - 150   Wt loss to date - - 0 - 0 - 0   % Wt Loss - - 0 - 0 - 0   80% EBW - - 102.4 - 103.68 - 110.4       There is no height or weight on file to calculate BMI. Past Medical History:   Diagnosis Date    Diabetes ([facility] [address]     gestational    Hypertension     during pregnancy    Sleep apnea     does not use cpap    Thyroid disease        Comorbidities:    Hypertension: resolved  Diabetes: resolved  Obstructive Sleep Apnea: resolved  Hyperlipidemia: not applicable  Stress Urinary Incontinence: not applicable  Gastroesophageal Reflux: not applicable  Weight related arthropathy:not applicable        Past Surgical History:   Procedure Laterality Date    HX  SECTION          HX OTHER SURGICAL      facial reconstruction - dog bite    HX TONSILLECTOMY      tonsillectomy    HX TUBAL LIGATION         Current Outpatient Medications   Medication Sig Dispense Refill    ondansetron (ZOFRAN ODT) 4 mg disintegrating tablet Take 1 Tab by mouth every eight (8) hours as needed for Nausea or Vomiting.  Indications: prevent nausea and vomiting after surgery 15 Tab 0    buPROPion XL (WELLBUTRIN XL) 300 mg XL tablet       cyanocobalamin 1,000 mcg tablet TAKE 1 TABLET BY MOUTH EVERY DAY      ferrous sulfate 325 mg (65 mg iron) tablet Take 1 Tab by mouth Daily (before breakfast). 90 Tab 1    ferrous sulfate 325 mg (65 mg iron) tablet Take 325 mg by mouth daily.  calcium-cholecalciferol, d3, (CALCIUM 600 + D) 600-125 mg-unit tab Take  by mouth.  levothyroxine (SYNTHROID) 112 mcg tablet Take 125 mcg by mouth Daily (before breakfast). No Known Allergies      Objective: There were no vitals taken for this visit. General: Alert, Healthy appearing and without distress  CV: regular rate  Pulmonary: Normal respiratory excursion  Abd: Wounds all clean and intact without infection or hernia. Appropriately tender at incision but otherwise ok  Skin: no ecchymoses noted. Assessment:     POD 13 from weight loss surgery    Plan:     1. Educated on diet advancement and pitfalls  2. Encouraged PO intake, especially liquids  3. Encouraged daily walking and exercise  4. Confirmed follow-up with dietician  5.  Follow-up 3 months

## 2021-04-05 NOTE — LETTER
4/5/2021 Patient: Prachi Strong YOB: 1980 Date of Visit: 4/5/2021 Marissa Dey MD 
1956 [employer] Jignesh A [address] [place] [address] Via Fax: [phone] Dear Marissa Dey MD, Thank you for referring Ms. Prachi Strong to [address] for evaluation. My notes for this consultation are attached. If you have questions, please do not hesitate to call me. I look forward to following your patient along with you.  
 
 
Sincerely, 
 
John Jones MD

## 2021-04-08 ENCOUNTER — DOCUMENTATION ONLY (OUTPATIENT)
Dept: SURGERY | Age: 41
End: 2021-04-08

## 2021-04-08 NOTE — PROGRESS NOTES
Patient called in stated that she is experiencing  Slight headaches, fatigue and has straw colored urine. Patient was concerning she was becoming dehydrated. .    It was suggested that th patient \" sip and not gulp\" her liquids. \ also to sip 2fl oz every 15 mins which would give her a total of 8oz an hour. patient was advised to alert us if symptoms  worsened. Patient stated no nausea nor vomiting. Her temperature and heart was fine. also that she would try out the suggestions given.

## 2021-05-06 ENCOUNTER — HOSPITAL ENCOUNTER (OUTPATIENT)
Dept: BARIATRICS/WEIGHT MGMT | Age: 41
Discharge: HOME | End: 2021-05-06

## 2021-05-06 ENCOUNTER — DOCUMENTATION ONLY (OUTPATIENT)
Dept: BARIATRICS/WEIGHT MGMT | Age: 41
End: 2021-05-06

## 2021-05-06 NOTE — PROGRESS NOTES
[facility]  POST-OP NUTRITION FOLLOW UP    Patient's Name: Sarika Milan  YOB: 1980  Surgery Date: 3/22/21      Procedure: Gastric Bypass    Surgeon: Dr. Starla Lee    Height: 5 f      Pre-Op Weight: 247     Current Weight: 208  Weight Lost: 39    BMI:  40.7    Attendance of support group:   When:   Why not:     Complications  Readmittance: None  Reoperations: None  Complications: None  IV Fluids: None  ER Trips: None    Problem Areas:   Nausea: None   Vomitin x:  Early on. Ate too fast.     Dumping Syndrome: None  Inadequate Protein: None, patient states she is getting 1 shake per day. Inadequate Fluids: Yes. Patient states she is not a fluid drinker and has never drank much. She states she is drinking 48 ounces of water plus the protein shake. Food Intolerance: None  Hunger: None  Constipation: Sometimes, but not overall. Eating 3 Meals/Day: Yes  Portion Size at Meals: 1-2 ounces     Protein from Food: Yes    Foods being consumed:  Breakfast: Time: 7:00 am: She states will have eggs for breakfast.  She may put green pepper in it with some cheese or a protein shake. Lunch: Time: 12:00-12:30 pm:   Patient states she may have chicken tacos (just the meat). She states sometimes she may eat a few olives with this. Dinner:  Time: 5:00 pm:   She states she loves tuna, but it is just not sitting well. She states the fishiness is too strong. She states she has had turkey, but mainly sticks with chicken. She states she does eat a lot of yogurt and will make that dinner if she is eating later. In-between eating: None    Length of time for meals:  Varies.   30 minutes    food/fluids: 30/30    Fluids: 48 oz/day   Types of Fluids: water, protein, Gatorade zero    MVI: Bariatric Advantage (Multi EA)   Number/Day: bid   Taken Separately: Yes    Vitamin B1: Included in BA MVI  Dosing:     Calcium: Chewable    Calcium Dosing: tid    Taken Separately: yes    Vitamin B12: Sublingual   Vitamin B12 Dosin mcg    Vitamin D: Getting adequate with BA and Calcium     Vitamin D dosing:     Iron: Included in MVI    Iron dosin mg     Protein Supplement: Premier     Grams of Protein: 30   Mixed with:      Splitting Protein Drink in 1/2:    Timing of Protein Drinks:   Patient is taking 7 days a week. Exercise: She states she is swimming, going for walks, kayaking soon. Comments:    Patient is doing well at 6 weeks post op. Her portions are appropriate to promote weight loss. She has been doing some vegetables, but is tolerating them well. She is doing a shake, taking her vitamins, walking or getting some daily movement in. She is encouraged to keep pushing her fluid and work towards 64 ounces. Patient was educated on the importance of eating meat and vegetables only. I have talked with patient about the effects of carbohydrates, not only from a weight management perspective, but also what effects it could have on their blood sugar and what reactive hypoglycemia is.         Diet Follow Up: 2022    Joe Maldonado [place] RD    2021

## 2021-05-12 ENCOUNTER — OFFICE VISIT (OUTPATIENT)
Dept: ORTHOPEDIC SURGERY | Age: 41
End: 2021-05-12
Payer: COMMERCIAL

## 2021-05-12 VITALS
RESPIRATION RATE: 16 BRPM | WEIGHT: 207 LBS | HEIGHT: 61 IN | OXYGEN SATURATION: 99 % | HEART RATE: 68 BPM | BODY MASS INDEX: 39.08 KG/M2 | TEMPERATURE: 96.4 F

## 2021-05-12 DIAGNOSIS — M25.562 LEFT KNEE PAIN, UNSPECIFIED CHRONICITY: ICD-10-CM

## 2021-05-12 DIAGNOSIS — S83.241A ACUTE MEDIAL MENISCUS TEAR, RIGHT, INITIAL ENCOUNTER: ICD-10-CM

## 2021-05-12 DIAGNOSIS — M17.0 PRIMARY OSTEOARTHRITIS OF BOTH KNEES: ICD-10-CM

## 2021-05-12 DIAGNOSIS — S83.242A ACUTE MEDIAL MENISCUS TEAR, LEFT, INITIAL ENCOUNTER: ICD-10-CM

## 2021-05-12 DIAGNOSIS — M25.561 RIGHT KNEE PAIN, UNSPECIFIED CHRONICITY: Primary | ICD-10-CM

## 2021-05-12 DIAGNOSIS — M25.561 RIGHT KNEE PAIN, UNSPECIFIED CHRONICITY: ICD-10-CM

## 2021-05-12 PROCEDURE — 99203 OFFICE O/P NEW LOW 30 MIN: CPT | Performed by: PHYSICIAN ASSISTANT

## 2021-05-12 PROCEDURE — 73560 X-RAY EXAM OF KNEE 1 OR 2: CPT | Performed by: PHYSICIAN ASSISTANT

## 2021-05-12 NOTE — PROGRESS NOTES
Prachi Strong  1980   Chief Complaint   Patient presents with    Knee Pain     Bilateral knee L>R        HISTORY OF PRESENT ILLNESS  Prachi Strong is a 42 y.o. female who presents today for evaluation of bilateral knee pain. Right knee started in  when she twisted her knee. Had a MRI that said she had a medial meniscus tear. She never had surgery. Left knee started when she was dancing in October. She had an MRI of her knee in [place] but she does not have records for this. She was seen by her PCP who aspirated her knee and injected her knee. This did not help at all. She notes both knees have a catching and a locking sensation especially when she twists. She feels a constant dull throbbing pain. She has pain walking downstairs. She also notes that she cannot run. Pain is a 4/10. Has tried following treatments: Injections:YES; Brace:NO;  Therapy:NO; Cane/Crutch:NO       No Known Allergies     Past Medical History:   Diagnosis Date    Diabetes ([facility] [address]     gestational    Hypertension     during pregnancy    Sleep apnea     does not use cpap    Thyroid disease       Social History     Socioeconomic History    Marital status:      Spouse name: Not on file    Number of children: Not on file    Years of education: Not on file    Highest education level: Not on file   Occupational History    Not on file   Social Needs    Financial resource strain: Not on file    Food insecurity     Worry: Not on file     Inability: Not on file    Transportation needs     Medical: Not on file     Non-medical: Not on file   Tobacco Use    Smoking status: Former Smoker     Quit date: 10/21/2013     Years since quittin.5    Smokeless tobacco: Never Used   Substance and Sexual Activity    Alcohol use: Not Currently     Frequency: Monthly or less    Drug use: Not Currently     Types: Cocaine, Marijuana     Comment: last used     Sexual activity: Not on file   Lifestyle    Physical activity Days per week: Not on file     Minutes per session: Not on file    Stress: Not on file   Relationships    Social connections     Talks on phone: Not on file     Gets together: Not on file     Attends Congregational service: Not on file     Active member of club or organization: Not on file     Attends meetings of clubs or organizations: Not on file     Relationship status: Not on file    Intimate partner violence     Fear of current or ex partner: Not on file     Emotionally abused: Not on file     Physically abused: Not on file     Forced sexual activity: Not on file   Other Topics Concern    Not on file   Social History Narrative    Not on file      Past Surgical History:   Procedure Laterality Date    HX  SECTION          HX GASTRIC BYPASS  2021    HX OTHER SURGICAL      facial reconstruction - dog bite    HX TONSILLECTOMY      tonsillectomy    HX TUBAL LIGATION        Family History   Problem Relation Age of Onset    No Known Problems Mother     Alcohol abuse Father     Lung Disease Father     Liver Disease Father       Current Outpatient Medications   Medication Sig    multivitamin (ONE A DAY) tablet Take 1 Tab by mouth daily. Bariatric Advantage    ondansetron (ZOFRAN ODT) 4 mg disintegrating tablet Take 1 Tab by mouth every eight (8) hours as needed for Nausea or Vomiting. Indications: prevent nausea and vomiting after surgery    buPROPion XL (WELLBUTRIN XL) 300 mg XL tablet     cyanocobalamin 1,000 mcg tablet TAKE 1 TABLET BY MOUTH EVERY DAY    ferrous sulfate 325 mg (65 mg iron) tablet Take 325 mg by mouth daily.  calcium-cholecalciferol, d3, (CALCIUM 600 + D) 600-125 mg-unit tab Take  by mouth.  levothyroxine (SYNTHROID) 112 mcg tablet Take 125 mcg by mouth Daily (before breakfast).  ferrous sulfate 325 mg (65 mg iron) tablet Take 1 Tab by mouth Daily (before breakfast). No current facility-administered medications for this visit.         REVIEW OF SYSTEM Patient denies: Weight loss, Fever/Chills, HA, Visual changes, Fatigue, Chest pain, SOB, Abdominal pain, N/V/D/C, Blood in stool or urine, Edema. Pertinent positive as above in HPI. All others were negative    PHYSICAL EXAM:   Visit Vitals  Pulse 68   Temp (!) 96.4 °F (35.8 °C) (Temporal)   Resp 16   Ht 5' 1\" (1.549 m)   Wt 207 lb (93.9 kg)   SpO2 99%   BMI 39.11 kg/m²     The patient is a well-developed, well-nourished female   in no acute distress. The patient is alert and oriented times three. The patient is alert and oriented times three. Mood and affect are normal.  LYMPHATIC: lymph nodes are not enlarged and are within normal limits  SKIN: normal in color and non tender to palpation. There are no bruises or abrasions noted. NEUROLOGICAL: Motor sensory exam is within normal limits. Reflexes are equal bilaterally. There is normal sensation to pinprick and light touch  MUSCULOSKELETAL:  Examination Left knee Right knee   Skin Intact Intact   Range of motion 0-120 0-120   Effusion + +   Medial joint line tenderness + +   Lateral joint line tenderness - -   Tenderness Pes Bursa - -   Tenderness insertion MCL - -   Tenderness insertion LCL - -   Williss - +   Patella crepitus + +   Patella grind - -   Lachman - -   Pivot shift - -   Anterior drawer - -   Posterior drawer - -   Varus stress - -   Valgus stress - -   Neurovascular Intact Intact   Calf Swelling and Tenderness to Palpation - -   Lucia's Test - -   Hamstring Cord Tightness - -            IMAGIN view xray images of bilateral knee taken in office on 2021 read and reviewed by myself reveal decreased joint space medial compartment and patella femoral compartment     IMPRESSION:      ICD-10-CM ICD-9-CM    1. Right knee pain, unspecified chronicity  M25.561 719.46 AMB POC XRAY, KNEE; 1/2 VIEWS      MRI KNEE RT WO CONT   2. Left knee pain, unspecified chronicity  M25.562 719.46 AMB POC XRAY, KNEE; 1/2 VIEWS   3.  Acute medial meniscus tear, left, initial encounter  S83.242A 836.0    4. Acute medial meniscus tear, right, initial encounter  S83.241A 836.0    5. Primary osteoarthritis of both knees  M17.0 715.16         PLAN:   1. Patient with worsening pain in bilateral knees. We will start with some hamstring stretches for her patellofemoral joint bilateral knees. Will also order a new MRI of her right knee given history of a previous medial meniscus tear with no surgical treatment. Left knee patient is going to obtain her records from her MRI that she had recently. Also will give Voltaren gel to help with her pain  Risk factors include: BMI>35, recent gastric bypass  2. No cortisone injection indicated today   3. No Physical/Occupational Therapy indicated today  4. Yes diagnostic test indicated today:   5. No durable medical equipment indicated today  6. No referral indicated today   7. Yes medications indicated today: voltaren gel  8.  No Narcotic indicated today     RTC after MRI     Ramey Power, PA-C [facility]

## 2021-05-13 ENCOUNTER — TELEPHONE (OUTPATIENT)
Dept: ORTHOPEDIC SURGERY | Age: 41
End: 2021-05-13

## 2021-05-13 NOTE — TELEPHONE ENCOUNTER
Patient is requesting to go to [facility]. for her MRI. Fax: [phone]    Patient can be reached at [phone].

## 2021-05-24 NOTE — PROGRESS NOTES
Ita Mar  1980   Chief Complaint   Patient presents with    Knee Pain     both knees mri results        HISTORY OF PRESENT ILLNESS  Ita Mar is a 42 y.o. female who presents today for reevaluation of b/l knee pain and MRI review. Patient rates pain as 1/10 today. Right knee started in 2013 when she twisted her knee. Had a MRI that said she had a medial meniscus tear. She never had surgery. Left knee started when she was dancing in October. She had an MRI of her left knee done in [place]. She was seen by her PCP who aspirated and injected her left knee around 1 month ago. This did not help at all. She notes both knees have a catching and a locking sensation especially when she twists. She feels a constant dull throbbing pain. She has pain walking downstairs. She also notes that she cannot run. Has lost 107 pounds since August. Had gastric bypass surgery in March. Knee pain has worsened since losing the weight. Has been to 18 weeks of PT with minimal relief. Patient denies any fever, chills, chest pain, shortness of breath or calf pain. The remainder of the review of systems is negative. There are no new illness or injuries to report since last seen in the office. There are no changes to medications, allergies, family or social history. Pain Assessment  5/25/2021   Location of Pain Knee   Location Modifiers Right;Left   Severity of Pain 1   Quality of Pain Aching   Quality of Pain Comment -   Duration of Pain A few hours   Frequency of Pain Several times daily   Date Pain First Started -   Date Pain First Started Comment -   Aggravating Factors Bending   Limiting Behavior Yes   Relieving Factors -   Result of Injury -       PHYSICAL EXAM:   Visit Vitals  Pulse 74   Resp 15   Ht 5' 1\" (1.549 m)   Wt 202 lb (91.6 kg)   SpO2 97%   BMI 38.17 kg/m²     The patient is a well-developed, well-nourished female   in no acute distress. The patient is alert and oriented times three.   The patient is alert and oriented times three. Mood and affect are normal.  LYMPHATIC: lymph nodes are not enlarged and are within normal limits  SKIN: normal in color and non tender to palpation. There are no bruises or abrasions noted. NEUROLOGICAL: Motor sensory exam is within normal limits. Reflexes are equal bilaterally. There is normal sensation to pinprick and light touch  MUSCULOSKELETAL:  Examination Left knee Right knee   Skin Intact Intact   Range of motion 0-120 0-120   Effusion + +   Medial joint line tenderness + +   Lateral joint line tenderness - -   Tenderness Pes Bursa - -   Tenderness insertion MCL - -   Tenderness insertion LCL - -   Williss - +   Patella crepitus + +   Patella grind - -   Lachman - -   Pivot shift - -   Anterior drawer - -   Posterior drawer - -   Varus stress - -   Valgus stress - -   Neurovascular Intact Intact   Calf Swelling and Tenderness to Palpation - -   Lucia's Test - -   Hamstring Cord Tightness - -        PROCEDURE:  Bilateral Knee Injection with Ultrasound Guidance    Indication:Bilateral Knee pain/swelling    After sterile prep, 4 cc of Xylocaine and 1 cc of Kenalog were injected into the bilateral  Knee. Intra-articular Ultrasound images captured using [address] [place] [address] Ultrasound machine using a frequency of 10 MHz with a linear transducer and scanned into patient's chart.            [facility]  OFFICE PROCEDURE PROGRESS NOTE        Chart reviewed for the following:  María Hogan M.D, have reviewed the History, Physical and updated the Allergic reactions for Mario Lopez performed immediately prior to start of procedure:  María Hogan M.D, have performed the following reviews on Colby Botello prior to the start of the procedure:            * Patient was identified by name and date of birth   * Agreement on procedure being performed was verified  * Risks and Benefits explained to the patient  * Procedure site verified and marked as necessary  * Patient was positioned for comfort  * Needle placement confirmed by ultrasound  * Consent was signed and verified     Time: 9:24 AM     Date of procedure: 5/25/2021    Procedure performed by:  Demario Nayak M.D    Provider assisted by: (see medication administration)    How tolerated by patient: tolerated the procedure well with no complications    Comments: none      IMAGING: MRI of right knee dated 5/21/2021 was reviewed and read by Dr. Aguilar Draft:   IMPRESSION:  1. No acute osseous findings  2. No discrete meniscal or ligamentous injury  3. Mild tricompartmental osteoarthritic changes as described but no evidence of full thickness cartilage loss. 4. Mild patellar tendinosis  5. Small joint effusion        MRI of left knee dated 10/09/2020 was reviewed and read by Dr. Aguilar Draft:   IMPRESSION:  1. No discrete meniscal or ligamentous injury. 2. Mild proximal patellar tendinosis  3. Very mild focal subchondral trabecular edema medially in the medial femoral condyle and medial tibial plateau. This is nonspecific and may be reactive or mechanical in nature. 4. Small joint effuson        2 view xray images of bilateral knee taken in office on 5/12/2021 read and reviewed by DIXON Chopra, reveal: decreased joint space medial compartment and patella femoral compartment       IMPRESSION:      ICD-10-CM ICD-9-CM    1. Primary osteoarthritis of both knees  M17.0 715.16 REFERRAL TO PHYSICAL THERAPY      triamcinolone acetonide (KENALOG-40) 40 mg/mL injection 80 mg      ARTHROCENTESIS ASPIR&/INJ MAJOR JT/BURSA W/US        PLAN:   1. Pt presents today with b/l knee pain due to MRI-documented primary OA and I would like to try injecting both knees with cortisone. Will also set up with PT. Risk factors include: BMI>35, recent gastric bypass  2. No ultrasound exam indicated today  3. Yes cortisone injection indicated today B/L KNEE US  4.  Yes Physical/Occupational Therapy indicated today  5. No diagnostic test indicated today:   6. No durable medical equipment indicated today  7. No referral indicated today   8. No medications indicated today:   9. No Narcotic indicated today       RTC 3-4 weeks if pain continues      Scribed by Mimi Rico [address] [place] [address] [address]) as dictated by Corine Canales MD    I, Dr. Corine Canales, confirm that all documentation is accurate.     Corine Canales M.D.   [facility]

## 2021-05-25 ENCOUNTER — OFFICE VISIT (OUTPATIENT)
Dept: ORTHOPEDIC SURGERY | Age: 41
End: 2021-05-25
Payer: COMMERCIAL

## 2021-05-25 VITALS
WEIGHT: 202 LBS | HEIGHT: 61 IN | HEART RATE: 74 BPM | BODY MASS INDEX: 38.14 KG/M2 | OXYGEN SATURATION: 97 % | RESPIRATION RATE: 15 BRPM

## 2021-05-25 DIAGNOSIS — M17.0 PRIMARY OSTEOARTHRITIS OF BOTH KNEES: Primary | ICD-10-CM

## 2021-05-25 PROCEDURE — 20611 DRAIN/INJ JOINT/BURSA W/US: CPT | Performed by: ORTHOPAEDIC SURGERY

## 2021-05-25 PROCEDURE — 99213 OFFICE O/P EST LOW 20 MIN: CPT | Performed by: ORTHOPAEDIC SURGERY

## 2021-05-25 RX ORDER — TRIAMCINOLONE ACETONIDE 40 MG/ML
80 INJECTION, SUSPENSION INTRA-ARTICULAR; INTRAMUSCULAR ONCE
Status: COMPLETED | OUTPATIENT
Start: 2021-05-25 | End: 2021-05-25

## 2021-05-25 RX ADMIN — TRIAMCINOLONE ACETONIDE 80 MG: 40 INJECTION, SUSPENSION INTRA-ARTICULAR; INTRAMUSCULAR at 11:01

## 2021-06-01 ENCOUNTER — HOSPITAL ENCOUNTER (OUTPATIENT)
Dept: PHYSICAL THERAPY | Age: 41
Discharge: HOME | End: 2021-06-01
Payer: COMMERCIAL

## 2021-06-01 PROCEDURE — 97530 THERAPEUTIC ACTIVITIES: CPT

## 2021-06-01 PROCEDURE — 97110 THERAPEUTIC EXERCISES: CPT

## 2021-06-01 PROCEDURE — 97162 PT EVAL MOD COMPLEX 30 MIN: CPT

## 2021-06-01 NOTE — PROGRESS NOTES
PT DAILY TREATMENT NOTE 11    Patient Name: Sera Mcleod  Date:2021  : 1980  [x]  Patient  Verified  Payor: BLUE CROSS / Plan: [place] [id number] PPO / Product Type: PPO /    In time: 946  Out time:1027  Total Treatment Time (min): 41  Visit #: 1 of 8    Medicare/BCBS Only   Total Timed Codes (min):  25 1:1 Treatment Time:  41       Treatment Area: Knee pain, right [M25.561]  Left knee pain [M25.562]    SUBJECTIVE  Pain Level (0-10 scale): 0  Any medication changes, allergies to medications, adverse drug reactions, diagnosis change, or new procedure performed?: [x] No    [] Yes (see summary sheet for update)  Subjective functional status/changes:   [] No changes reported  Patient reports tearing her right medial meniscus in , however did not have surgery. She reports her left knee starting to bother her in 2020 when she was dancing with her daughter. She began losing weight and has since lost 112 pounds and has started to become more active. When she attempted running/jumping earlier this year she reports inability and great pain in both knees. Patient reports having good relief with cortisone injections to bilateral knee last week. She reports improved pain with rest and ice and worse with over activity. OBJECTIVE    16 min [x]Eval                  []Re-Eval       15 min Therapeutic Exercise:  [x] See flow sheet : Patient provided printed HEP to begin addressing impairments. Patient provided demonstration of exercises and rationale for each exercise to improve compliance to HEP. Education provided on importance of compliance to HEP for improved carry over between therapy session. Rationale: increase ROM, increase strength and improve coordination to improve the patients ability to perform ADLs and self care tasks with greater ease     10 min Therapeutic Activity:  [x]  See flow sheet : Patient educated on pathology, findings, and treatment plan of care.  Patient education provided on use of CP application for symptom management. Rationale: improve coordination  to improve the patients ability to perform functional mobility with greater ease            With   [] TE   [] TA   [] neuro   [] other: Patient Education: [x] Review HEP    [] Progressed/Changed HEP based on:   [] positioning   [] body mechanics   [] transfers   [] heat/ice application    [] other:      Other Objective/Functional Measures: see paper chart for evaluation form      Pain Level (0-10 scale) post treatment: 0    ASSESSMENT/Changes in Function: Patient is a 42year old female presenting to initial evaluation with complaints of bilateral knee pain (left > right) beginning in October. She reports dancing with her daughter and feeling a \"pop\" in the knee causing immediate pain. Patient reports difficulty with stair negotiation, squats, running, jumping, and walking >2 miles. She demonstrates slight decreased flexion AROM bilaterally, decreased right patellar mobility, decreased quad strength on right LE and decreased HS strength on left, decreased flexibility through ITB, quads, and HS bilaterally, and impaired balance. Patient with signs and symptoms consistent with OA in bilateral knees. Patient would benefit from skilled PT 2x/week for 4 weeks to address the above impairments and improve overall functional mobility with greater ease and promote return to PLOF. Patient will continue to benefit from skilled PT services to modify and progress therapeutic interventions, address functional mobility deficits, address ROM deficits, address strength deficits, analyze and address soft tissue restrictions, analyze and cue movement patterns, analyze and modify body mechanics/ergonomics, assess and modify postural abnormalities, address imbalance/dizziness and instruct in home and community integration to attain remaining goals.      [x]  See Plan of Care  []  See progress note/recertification  []  See Discharge Summary         Progress towards goals / Updated goals:  Short Term Goals: To be accomplished in 2 weeks:  1. Patient will report performance of home exercise program 4 of 7 days in the next week demonstrating compliance to therapy program and progress towards independent management of symptoms. Eval: HEP provided     Long Term Goals: To be accomplished in 4 weeks:  1. Patient will report no difficulty with walking/jogging >2 miles to improve overall mobility and promote return to PLOF. Eval: soreness following 2 mile walk  2. Patient will increase bilateral knee flexion to 125 degrees to improve transfers and stair negotiation. Eval: 115 degrees bilaterally with tight/pulling  3. Patient will report no difficulty with negotiating stairs reciprocally without HR to improve overall mobility throughout home. Eval: step-to pattern, difficulty with descending, bilateral HR use   4. Patient will improve bilateral quad and HS strength to 5/5 to perform all daily and recreational activities with greater ease. Eval: right quad 4+/5, right HS 4/5, left quad 4/5, left HS 4+/5  5. Patient will improve FOTO score to 68 to demonstrate return to prior level of function and to improve patients ability to perform household activities with greater ease.    Eval: 47    PLAN  [x]  Upgrade activities as tolerated     []  Continue plan of care  []  Update interventions per flow sheet       []  Discharge due to:_  []  Other:_      Ryder Torres, PT, DPT 6/1/2021  10:14 AM    Future Appointments   Date Time Provider Debra Flood   6/28/2021  1:10 PM Benay Runner, MD [facility] BS AMB

## 2021-06-01 NOTE — PROGRESS NOTES
[facility]  [address]  [place], [address]  [phone] fax    Plan of Care/ Statement of Necessity for Physical Therapy Services    Patient name: Baylee Kamara Start of Care: 2021   Referral source: Thad Vick,* : 1980    Medical Diagnosis: Knee pain, right [M25.561]  Left knee pain [M25.562]  Payor: [employer] / Plan: [facility] PPO / Product Type: PPO /  Onset Date: , exacerbation 2020   Treatment Diagnosis: right knee pain, left knee pain    Prior Hospitalization: see medical history Provider#: [id number]   Medications: Verified on Patient summary List    Comorbidities: arthritis, BMI >30, prior surgery (gastric bypass)    Prior Level of Function: active with kids including swimming, playing sports, jogging/walk, fully independent      The Plan of Care and following information is based on the information from the initial evaluation. Assessment/ key information: Patient is a 42year old female presenting to initial evaluation with complaints of bilateral knee pain (left > right) beginning in October. She reports dancing with her daughter and feeling a \"pop\" in the knee causing immediate pain. Patient reports difficulty with stair negotiation, squats, running, jumping, and walking >2 miles. She demonstrates slight decreased flexion AROM bilaterally, decreased right patellar mobility, decreased quad strength on right LE and decreased HS strength on left, decreased flexibility through ITB, quads, and HS bilaterally, and impaired balance. Patient with signs and symptoms consistent with OA in bilateral knees. Patient would benefit from skilled PT 2x/week for 4 weeks to address the above impairments and improve overall functional mobility with greater ease and promote return to PLOF.      Evaluation Complexity History HIGH Complexity :3+ comorbidities / personal factors will impact the outcome/ POC ; Examination MEDIUM Complexity : 3 Standardized tests and measures addressing body structure, function, activity limitation and / or participation in recreation  ;Presentation MEDIUM Complexity : Evolving with changing characteristics  ; Clinical Decision Making MEDIUM Complexity : FOTO score of 26-74  Overall Complexity Rating: MEDIUM  Problem List: pain affecting function, decrease ROM, decrease strength, impaired gait/ balance, decrease ADL/ functional abilitiies, decrease activity tolerance, decrease flexibility/ joint mobility and decrease transfer abilities   Treatment Plan may include any combination of the following: Therapeutic exercise, Therapeutic activities, Neuromuscular re-education, Physical agent/modality, Gait/balance training, Manual therapy, Patient education, Self Care training, Functional mobility training, Home safety training and Stair training  Patient / Family readiness to learn indicated by: asking questions, trying to perform skills and interest  Persons(s) to be included in education: patient (P)  Barriers to Learning/Limitations: None  Patient Goal (s): be able to run and perform yoga pose mirlande  Patient Self Reported Health Status: good  Rehabilitation Potential: good    Short Term Goals: To be accomplished in 2 weeks:  1. Patient will report performance of home exercise program 4 of 7 days in the next week demonstrating compliance to therapy program and progress towards independent management of symptoms. Long Term Goals: To be accomplished in 4 weeks:  1. Patient will report no difficulty with walking/jogging >2 miles to improve overall mobility and promote return to PLOF. 2. Patient will increase bilateral knee flexion to 125 degrees to improve transfers and stair negotiation. 3. Patient will report no difficulty with negotiating stairs reciprocally without HR to improve overall mobility throughout home.   4. Patient will improve bilateral quad and HS strength to 5/5 to perform all daily and recreational activities with greater ease. 5. Patient will improve FOTO score to 68 to demonstrate return to prior level of function and to improve patients ability to perform household activities with greater ease. Frequency / Duration: Patient to be seen 2 times per week for 4 weeks. Patient/ Caregiver education and instruction: Diagnosis, prognosis, activity modification, exercises and other cold pack application for symptom management   [x]  Plan of care has been reviewed with ROJELIO Gregg PT, DPT 6/1/2021 10:15 AM    ________________________________________________________________________    I certify that the above Therapy Services are being furnished while the patient is under my care. I agree with the treatment plan and certify that this therapy is necessary.     [de-identified] Signature:____________Date:_________TIME:________     Camila Chen,*  ** Signature, Date and Time must be completed for valid certification **    Please sign and return to [facility] [address]  [address]  [place], [address]  [phone] fax

## 2021-06-08 ENCOUNTER — HOSPITAL ENCOUNTER (OUTPATIENT)
Dept: PHYSICAL THERAPY | Age: 41
Discharge: HOME | End: 2021-06-08
Payer: COMMERCIAL

## 2021-06-08 ENCOUNTER — TELEPHONE (OUTPATIENT)
Dept: ORTHOPEDIC SURGERY | Age: 41
End: 2021-06-08

## 2021-06-08 PROCEDURE — 97110 THERAPEUTIC EXERCISES: CPT

## 2021-06-08 PROCEDURE — 97112 NEUROMUSCULAR REEDUCATION: CPT

## 2021-06-08 NOTE — TELEPHONE ENCOUNTER
Patient stopped by [facility]  just to let Dr. Isamar Maya know that the cortisone injection has really helped her and she feels so much better. No return call needed.

## 2021-06-08 NOTE — PROGRESS NOTES
"              After Visit Summary   2017    Yesi Vaughan    MRN: [id number]           Patient Information     Date Of Birth          1972        Visit Information        Provider Department      2017 2:30 PM Flaca Hernandez APRN CNP [facility]        Today's Diagnoses     Fibroids, intramural    -  1       Follow-ups after your visit        Who to contact     If you have questions or need follow up information about today's clinic visit or your schedule please contact [facility] directly at [phone].  Normal or non-critical lab and imaging results will be communicated to you by [employer], letter or phone within 4 business days after the clinic has received the results. If you do not hear from us within 7 days, please contact the clinic through [employer] or phone. If you have a critical or abnormal lab result, we will notify you by phone as soon as possible.  Submit refill requests through [employer] or call your pharmacy and they will forward the refill request to us. Please allow 3 business days for your refill to be completed.          Additional Information About Your Visit        MyChart Information     [email or web address] lets you send messages to your doctor, view your test results, renew your prescriptions, schedule appointments and more. To sign up, go to [email or web address] . Click on \"Log in\" on the left side of the screen, which will take you to the Welcome page. Then click on \"Sign up Now\" on the right side of the page.     You will be asked to enter the access code listed below, as well as some personal information. Please follow the directions to create your username and password.     Your access code is: [email or web address]  Expires: 2017 12:10 PM     Your access code will  in 90 days. If you need help or a new code, please call your [place] clinic or [phone].        Care EveryWhere ID     This is your Care EveryWhere ID. This could be " PT DAILY TREATMENT NOTE     Patient Name: Bennie Jensen  Date:2021  : 1980  [x]  Patient  Verified  Payor: BLUE CROSS / Plan: [facility] PPO / Product Type: PPO /    In time:4:01  Out time:4:39  Total Treatment Time (min): 38  Visit #: 2 of 8    Medicare/BCBS Only   Total Timed Codes (min):  38 1:1 Treatment Time:  38       Treatment Area: Knee pain, right [M25.561]  Left knee pain [M25.562]    SUBJECTIVE  Pain Level (0-10 scale): 0/10  Any medication changes, allergies to medications, adverse drug reactions, diagnosis change, or new procedure performed?: [x] No    [] Yes (see summary sheet for update)  Subjective functional status/changes:   [] No changes reported  The patient states that her knees have been doing ok. She reports compliance with HEP, but not liking the \"clams\". OBJECTIVE  23 min Therapeutic Exercise:  [x] See flow sheet :   Rationale: increase ROM and increase strength to improve the patients ability to improve ADL ease. 15 min Neuromuscular Re-education:  [x]  See flow sheet :   Rationale: increase ROM, increase strength and improve coordination  to improve the patients ability to improve ADL ease. With   [] TE   [] TA   [] neuro   [] other: Patient Education: [x] Review HEP    [] Progressed/Changed HEP based on:   [] positioning   [] body mechanics   [] transfers   [] heat/ice application    [] other:      Other Objective/Functional Measures:   Initiated wall squats through 1/4 ranges with good form, but does have some discomfort through patellofemoral joints. Pain Level (0-10 scale) post treatment: 0/10    ASSESSMENT/Changes in Function: Fatigue reported throughout session into quad and glutes. Compliance reported of HEP. She completed session and left in no apparent distress and denies pain upon departure.      Patient will continue to benefit from skilled PT services to modify and progress therapeutic interventions, address functional "used by other organizations to access your [place] medical records  [id number]        Your Vitals Were     Height Last Period BMI (Body Mass Index)             5' 8\" (1.727 m) 05/02/2017 30.87 kg/m2          Blood Pressure from Last 3 Encounters:   05/04/17 124/84   05/04/17 124/84    Weight from Last 3 Encounters:   05/04/17 203 lb (92.1 kg)   05/04/17 203 lb (92.1 kg)              Today, you had the following     No orders found for display       Primary Care Provider Office Phone # Fax #    Lyubov Faith [phone]       AMARJIT NICOLLET [address] [address]        Thank you!     Thank you for choosing [facility]  for your care. Our goal is always to provide you with excellent care. Hearing back from our patients is one way we can continue to improve our services. Please take a few minutes to complete the written survey that you may receive in the mail after your visit with us. Thank you!             Your Updated Medication List - Protect others around you: Learn how to safely use, store and throw away your medicines at www.disposemymeds.org.          This list is accurate as of: 5/4/17  3:27 PM.  Always use your most recent med list.                   Brand Name Dispense Instructions for use    lisinopril 20 MG tablet    PRINIVIL/ZESTRIL     TAKE 1 TAB BY MOUTH DAILY.       MULTIVITAMIN PO            " mobility deficits, address ROM deficits, address strength deficits, analyze and address soft tissue restrictions, analyze and cue movement patterns, analyze and modify body mechanics/ergonomics, assess and modify postural abnormalities and instruct in home and community integration to attain remaining goals. []  See Plan of Care  []  See progress note/recertification  []  See Discharge Summary         Progress towards goals / Updated goals:  Short Term Goals: To be accomplished in 2 weeks:  1. Patient will report performance of home exercise program 4 of 7 days in the next week demonstrating compliance to therapy program and progress towards independent management of symptoms. Eval: HEP provided     Current: Met - reports compliance 6/08/2021  Long Term Goals: To be accomplished in 4 weeks:  1. Patient will report no difficulty with walking/jogging >2 miles to improve overall mobility and promote return to PLOF. Eval: soreness following 2 mile walk  2. Patient will increase bilateral knee flexion to 125 degrees to improve transfers and stair negotiation. Eval: 115 degrees bilaterally with tight/pulling  3. Patient will report no difficulty with negotiating stairs reciprocally without HR to improve overall mobility throughout home. Eval: step-to pattern, difficulty with descending, bilateral HR use   4. Patient will improve bilateral quad and HS strength to 5/5 to perform all daily and recreational activities with greater ease. Eval: right quad 4+/5, right HS 4/5, left quad 4/5, left HS 4+/5  5. Patient will improve FOTO score to 68 to demonstrate return to prior level of function and to improve patients ability to perform household activities with greater ease.               Eval: 54      PLAN  []  Upgrade activities as tolerated     [x]  Continue plan of care  []  Update interventions per flow sheet       []  Discharge due to:_  []  Other:_ Mary Peres, PT 6/8/2021  4:13 PM    Future Appointments   Date Time Provider Debra Milleri   6/10/2021  4:30 PM Vega Carter, PT [facility]   6/15/2021  3:00 PM Julio Pino [facility]   6/17/2021  4:00 PM Julio Pino [facility]   6/22/2021  3:45 PM Loida Sánchez [facility]   6/24/2021  4:00 PM Loida Sánchez [facility]   6/28/2021  1:10 PM Mamie Nick MD [facility]

## 2021-06-15 ENCOUNTER — APPOINTMENT (OUTPATIENT)
Dept: PHYSICAL THERAPY | Age: 41
End: 2021-06-15
Payer: COMMERCIAL

## 2021-06-17 ENCOUNTER — HOSPITAL ENCOUNTER (OUTPATIENT)
Dept: PHYSICAL THERAPY | Age: 41
Discharge: HOME | End: 2021-06-17
Payer: COMMERCIAL

## 2021-06-17 PROCEDURE — 97112 NEUROMUSCULAR REEDUCATION: CPT

## 2021-06-17 PROCEDURE — 97110 THERAPEUTIC EXERCISES: CPT

## 2021-06-17 NOTE — PROGRESS NOTES
PT DAILY TREATMENT NOTE     Patient Name: Baron Malhotra  Date:2021  : 1980  [x]  Patient  Verified  Payor: BLUE CROSS / Plan: [facility] PPO / Product Type: PPO /    In time:400  Out time:445  Total Treatment Time (min): 45  Visit #: 3 of 8    Medicare/BCBS Only   Total Timed Codes (min):  45 1:1 Treatment Time:  44       Treatment Area: Knee pain, right [M25.561]  Left knee pain [M25.562]    SUBJECTIVE  Pain Level (0-10 scale): 0  Any medication changes, allergies to medications, adverse drug reactions, diagnosis change, or new procedure performed?: [x] No    [] Yes (see summary sheet for update)  Subjective functional status/changes:   [] No changes reported  \"These shots are really working. I'm waiting for them to wear off to see how the exercises feel then. \"     OBJECTIVE    28 min Therapeutic Exercise:  [x] See flow sheet :   Rationale: increase ROM, increase strength and improve coordination to improve the patients ability to perform ADLs and self care tasks with greater ease     16 min Neuromuscular Re-education:  [x]  See flow sheet: dynamic step ups, SLS balance on airex with ball toss at rebounder, band walks x3, 3 way hip on airex, bridge with red PB   Rationale: increase strength, improve coordination, improve balance and increase proprioception  to improve the patients ability to perform functional mobility with greater stabilization and control           With   [] TE   [] TA   [] neuro   [] other: Patient Education: [x] Review HEP    [] Progressed/Changed HEP based on:   [] positioning   [] body mechanics   [] transfers   [] heat/ice application    [] other:      Other Objective/Functional Measures: introduced band walks x3, SLS on airex with ball toss at rebounder     Pain Level (0-10 scale) post treatment: 0    ASSESSMENT/Changes in Function: Patient wearing sandals today limiting full activities including jumping drills and lunges.  Patient understands importance of wearing supportive shoes including sneakers to maximize therapy visits. She was challenged with addition of SLS balance on airex with ball toss requiring several touch down of opposite foot to prevent LOB. Patient leaving with no pain post-session. She would benefit from continued therapy to further address strength, ROM, balance, and dynamic mobility deficits impacting functional mobility. Patient will continue to benefit from skilled PT services to modify and progress therapeutic interventions, address functional mobility deficits, address ROM deficits, address strength deficits, analyze and address soft tissue restrictions, analyze and cue movement patterns, analyze and modify body mechanics/ergonomics, assess and modify postural abnormalities, address imbalance/dizziness and instruct in home and community integration to attain remaining goals. []  See Plan of Care  []  See progress note/recertification  []  See Discharge Summary         Progress towards goals / Updated goals:  Short Term Goals: To be accomplished in 2 weeks:  1. Patient will report performance of home exercise program 4 of 7 days in the next week demonstrating compliance to therapy program and progress towards independent management of symptoms.             Eval: HEP provided               Current: Met - reports compliance 6/08/2021  Long Term Goals: To be accomplished in 4 weeks:  1. Patient will report no difficulty with walking/jogging >2 miles to improve overall mobility and promote return to PLOF.             Eval: soreness following 2 mile walk   Current: Progressing 6/17/2021 - walked ~1 hour yesterday without much difficulty     2. Patient will increase bilateral knee flexion to 125 degrees to improve transfers and stair negotiation.               Eval: 115 degrees bilaterally with tight/pulling  3.  Patient will report no difficulty with negotiating stairs reciprocally without HR to improve overall mobility throughout home.              Eval: step-to pattern, difficulty with descending, bilateral HR use   4. Patient will improve bilateral quad and HS strength to 5/5 to perform all daily and recreational activities with greater ease.               Eval: right quad 4+/5, right HS 4/5, left quad 4/5, left HS 4+/5  5.  Patient will improve FOTO score to 68 to demonstrate return to prior level of function and to improve patients ability to perform household activities with greater ease.              Eval: 54     PLAN  [x]  Upgrade activities as tolerated     []  Continue plan of care  []  Update interventions per flow sheet       []  Discharge due to:_  []  Other:_      James Whelan, PT, DPT 6/17/2021  4:16 PM    Future Appointments   Date Time Provider Debra Flood   6/22/2021  3:45 PM Randy Talbot [facility]   6/24/2021  4:00 PM Randy Talbot [facility]   6/28/2021  1:10 PM Frantz Sterling MD [facility]   6[facility]29/2021  3[facility]45 [facility]

## 2021-06-22 ENCOUNTER — HOSPITAL ENCOUNTER (OUTPATIENT)
Dept: PHYSICAL THERAPY | Age: 41
Discharge: HOME | End: 2021-06-22
Payer: COMMERCIAL

## 2021-06-22 PROCEDURE — 97110 THERAPEUTIC EXERCISES: CPT

## 2021-06-22 PROCEDURE — 97112 NEUROMUSCULAR REEDUCATION: CPT

## 2021-06-22 NOTE — PROGRESS NOTES
PT DAILY TREATMENT NOTE     Patient Name: Holley Frias  Date:2021  : 1980  [x]  Patient  Verified  Payor: BLUE CROSS / Plan: [facility] PPO / Product Type: PPO /    In time: 348  Out time: 432  Total Treatment Time (min): 44  Visit #: 4 of 8    Medicare/BCBS Only   Total Timed Codes (min):  44 1:1 Treatment Time:  44       Treatment Area: Knee pain, right [M25.561]  Left knee pain [M25.562]    SUBJECTIVE  Pain Level (0-10 scale): 0  Any medication changes, allergies to medications, adverse drug reactions, diagnosis change, or new procedure performed?: [x] No    [] Yes (see summary sheet for update)  Subjective functional status/changes:   [] No changes reported  Patient reports jogging about a block and half with no pain afterwards. OBJECTIVE    29 min Therapeutic Exercise:  [x] See flow sheet :   Rationale: increase ROM, increase strength and improve coordination to improve the patients ability to perform ADLs and self care tasks with greater ease      15 min Neuromuscular Re-education:  [x]  See flow sheet: 3 way hip on shuttle balance, dynamic step ups on bosu, 3 way band walks, reverse lunges     Rationale: increase strength, improve coordination, improve balance and increase proprioception  to improve the patients ability to perform functional tasks with greater ease and control           With   [] TE   [] TA   [] neuro   [] other: Patient Education: [x] Review HEP    [] Progressed/Changed HEP based on:   [] positioning   [] body mechanics   [] transfers   [] heat/ice application    [] other:      Other Objective/Functional Measures: progressed 3 way hip from airex to shuttle balance, increased distance with band walks, progressed step ups from 6 inch box to bosu      Pain Level (0-10 scale) post treatment: 0     ASSESSMENT/Changes in Function: Patient completing exercises as prescribed.  She is challenged with progression of 3 way hip to shuttle balance requiring frequent UE support to prevent LOB. Reverse lunges resulting in greater difficulty with left foot forward. Anticipate introducing jogging, jumping, and plyo training next visit to improve overall functional mobility. Patient will continue to benefit from skilled PT services to modify and progress therapeutic interventions, address functional mobility deficits, address ROM deficits, address strength deficits, analyze and address soft tissue restrictions, analyze and cue movement patterns, analyze and modify body mechanics/ergonomics, assess and modify postural abnormalities, address imbalance/dizziness and instruct in home and community integration to attain remaining goals. []  See Plan of Care  []  See progress note/recertification  []  See Discharge Summary         Progress towards goals / Updated goals:  Short Term Goals: To be accomplished in 2 weeks:  1. Patient will report performance of home exercise program 4 of 7 days in the next week demonstrating compliance to therapy program and progress towards independent management of symptoms.             Eval: HEP provided               HBCYXIA: Met - reports compliance 6/08/2021    Long Term Goals: To be accomplished in 4 weeks:  1. Patient will report no difficulty with walking/jogging >2 miles to improve overall mobility and promote return to PLOF.             Eval: soreness following 2 mile walk              Current: Progressing 6/17/2021 - walked ~1 hour yesterday without much difficulty   2. Patient will increase bilateral knee flexion to 125 degrees to improve transfers and stair negotiation.               Eval: 115 degrees bilaterally with tight/pulling  3.  Patient will report no difficulty with negotiating stairs reciprocally without HR to improve overall mobility throughout home.              Eval: step-to pattern, difficulty with descending, bilateral HR use   Current: Progressing 6/22/2021 - some use of handrails if needed (not holding on for ascending), reciprocal pattern   4. Patient will improve bilateral quad and HS strength to 5/5 to perform all daily and recreational activities with greater ease.               Eval: right quad 4+/5, right HS 4/5, left quad 4/5, left HS 4+/5  5.  Patient will improve FOTO score to 68 to demonstrate return to prior level of function and to improve patients ability to perform household activities with greater ease.              Eval: 54     PLAN  [x]  Upgrade activities as tolerated     []  Continue plan of care  []  Update interventions per flow sheet       []  Discharge due to:_  []  Other:_      Orval Peggy, PT, DPT 6/22/2021  3:52 PM    Future Appointments   Date Time Provider Debra Milleri   6/24/2021  4:00 PM Abiel Patterson [facility]   6/28/2021  1:10 PM Jania Mcadams MD [facility]   [facility]29[facility]2021  [facility]

## 2021-06-24 ENCOUNTER — HOSPITAL ENCOUNTER (OUTPATIENT)
Dept: PHYSICAL THERAPY | Age: 41
Discharge: HOME | End: 2021-06-24
Payer: COMMERCIAL

## 2021-06-24 PROCEDURE — 97112 NEUROMUSCULAR REEDUCATION: CPT

## 2021-06-24 PROCEDURE — 97110 THERAPEUTIC EXERCISES: CPT

## 2021-06-24 NOTE — PROGRESS NOTES
PT DAILY TREATMENT NOTE     Patient Name: Zoey Vanegas  Date:2021  : 1980  [x]  Patient  Verified  Payor: BLUE CROSS / Plan: [facility] PPO / Product Type: PPO /    In time:359  Out time: 448  Total Treatment Time (min): 49  Visit #: 5 of 8    Medicare/BCBS Only   Total Timed Codes (min):  49 1:1 Treatment Time:  47 (-2 minutes)       Treatment Area: Knee pain, right [M25.561]  Left knee pain [M25.562]    SUBJECTIVE  Pain Level (0-10 scale): 0  Any medication changes, allergies to medications, adverse drug reactions, diagnosis change, or new procedure performed?: [x] No    [] Yes (see summary sheet for update)  Subjective functional status/changes:   [] No changes reported  Patient reports she was slightly sore after last session. She reports \"popcorn\" cracks in her knee this morning with stretching.      OBJECTIVE     18 min Therapeutic Exercise:  [x] See flow sheet :   Rationale: increase ROM and increase strength to improve the patients ability to perform ADLs and self care tasks with greater ease     30 min Neuromuscular Re-education:  [x]  See flow sheet: shuttle press squat jumps, wall jumps, lateral jump over line, wall sit with ball toss, rebounder ball toss SLS on airex, CHILANGO slides reverse and lateral lunges    Rationale: increase strength, improve coordination, improve balance and increase proprioception  to improve the patients ability to perform functional mobility with greater stabilization and control           With   [] TE   [] TA   [] neuro   [] other: Patient Education: [x] Review HEP    [] Progressed/Changed HEP based on:   [] positioning   [] body mechanics   [] transfers   [] heat/ice application    [] other:      Other Objective/Functional Measures: introduced standing squats, wall sit with ball toss, lateral lunges with CHILANGO slide, and lateral line hops and wall tap jumps      Pain Level (0-10 scale) post treatment: 0 \"sore\"    ASSESSMENT/Changes in Function: Patient completing all exercises as prescribed. Introduced slow jog for last minute of warmup with good tolerance. All exercises completed today with minimal discomfort to the knee. She demonstrates improved form with free-standing squats and reverse lunges today. Anticipate progressing squats to shuttle balance or bosu ball next visit to further challenge proprioception and stabilization. Overall, patient progressing very well with therapy and tolerating exercises well. Continue progressing ROM, LE strength (eccentric loading), and dynamic stability to improve overall mobility and improved ease with daily and recreational activities. Patient will continue to benefit from skilled PT services to modify and progress therapeutic interventions, address functional mobility deficits, address ROM deficits, address strength deficits, analyze and address soft tissue restrictions, analyze and cue movement patterns, analyze and modify body mechanics/ergonomics, assess and modify postural abnormalities, address imbalance/dizziness and instruct in home and community integration to attain remaining goals. []  See Plan of Care  []  See progress note/recertification  []  See Discharge Summary         Progress towards goals / Updated goals:  Short Term Goals: To be accomplished in 2 weeks:  1. Patient will report performance of home exercise program 4 of 7 days in the next week demonstrating compliance to therapy program and progress towards independent management of symptoms.             Eval: HEP provided               LDTBNLO: Met - reports compliance 6/08/2021     Long Term Goals: To be accomplished in 4 weeks:  1. Patient will report no difficulty with walking/jogging >2 miles to improve overall mobility and promote return to PLOF.             Eval: soreness following 2 mile walk              Current: Progressing 6/17/2021 - walked ~1 hour yesterday without much difficulty   2.  Patient will increase bilateral knee flexion to 125 degrees to improve transfers and stair negotiation.               Eval: 115 degrees bilaterally with tight/pulling  3. Patient will report no difficulty with negotiating stairs reciprocally without HR to improve overall mobility throughout home.              Eval: step-to pattern, difficulty with descending, bilateral HR use              Current: Progressing 6/22/2021 - some use of handrails if needed (not holding on for ascending), reciprocal pattern   4. Patient will improve bilateral quad and HS strength to 5/5 to perform all daily and recreational activities with greater ease.               Eval: right quad 4+/5, right HS 4/5, left quad 4/5, left HS 4+/5  5.  Patient will improve FOTO score to 68 to demonstrate return to prior level of function and to improve patients ability to perform household activities with greater ease.              Eval: 54     PLAN  [x]  Upgrade activities as tolerated     []  Continue plan of care  []  Update interventions per flow sheet       []  Discharge due to:_  []  Other:_      James Whelan, PT, DPT 6/24/2021  4:05 PM    Future Appointments   Date Time Provider Debra Flood   6/28/2021  1:10 PM Frantz Sterling MD [facility]   6/29/2021  3:45 [facility]

## 2021-06-24 NOTE — PROGRESS NOTES
Chinedu Veras  1980   Chief Complaint   Patient presents with    Knee Pain     bilateral knees        HISTORY OF PRESENT ILLNESS  Chinedu Veras is a 42 y.o. female who presents today for reevaluation of b/l knee pain. Patient rates pain as 0/10 today. At last OV on 5/25/2021, patient had a b/l knee cortisone injection which provided good relief. Has been able to jump rope. She is doing a lot better. Has been going to PT as well with relief. Has been able to do squats and go walking. She has been doing her home exercise program. She was seen by her PCP who aspirated and injected her left knee around 2 months ago. This did not help at all. Has been to 18 weeks of PT with minimal relief. Has lost 107 pounds since August. Had gastric bypass surgery in March. Patient denies any fever, chills, chest pain, shortness of breath or calf pain. The remainder of the review of systems is negative. There are no new illness or injuries to report since last seen in the office. There are no changes to medications, allergies, family or social history. PHYSICAL EXAM:   Visit Vitals  Pulse 68   Resp 15   Ht 5' (1.524 m)   Wt 184 lb (83.5 kg)   SpO2 99%   BMI 35.94 kg/m²     The patient is a well-developed, well-nourished female   in no acute distress. The patient is alert and oriented times three. The patient is alert and oriented times three. Mood and affect are normal.  LYMPHATIC: lymph nodes are not enlarged and are within normal limits  SKIN: normal in color and non tender to palpation. There are no bruises or abrasions noted. NEUROLOGICAL: Motor sensory exam is within normal limits. Reflexes are equal bilaterally.  There is normal sensation to pinprick and light touch  MUSCULOSKELETAL:  Examination Left knee Right knee   Skin Intact Intact   Range of motion 0-120 0-120   Effusion + +   Medial joint line tenderness + +   Lateral joint line tenderness - -   Tenderness Pes Bursa - -   Tenderness insertion MCL - -   Tenderness insertion LCL - -   Williss - +   Patella crepitus + +   Patella grind - -   Lachman - -   Pivot shift - -   Anterior drawer - -   Posterior drawer - -   Varus stress - -   Valgus stress - -   Neurovascular Intact Intact   Calf Swelling and Tenderness to Palpation - -   Lucia's Test - -   Hamstring Cord Tightness - -        IMAGING: MRI of right knee dated 5/21/2021 was reviewed and read by Dr. Medina Credit:   IMPRESSION:  1. No acute osseous findings  2. No discrete meniscal or ligamentous injury  3. Mild tricompartmental osteoarthritic changes as described but no evidence of full thickness cartilage loss. 4. Mild patellar tendinosis  5. Small joint effusion        MRI of left knee dated 10/09/2020 was reviewed and read by Dr. Medina Credit:   IMPRESSION:  1. No discrete meniscal or ligamentous injury. 2. Mild proximal patellar tendinosis  3. Very mild focal subchondral trabecular edema medially in the medial femoral condyle and medial tibial plateau. This is nonspecific and may be reactive or mechanical in nature. 4. Small joint effuson        2 view xray images of bilateral knee taken in office on 5/12/2021 read and reviewed by DIXON Becerril, reveal: decreased joint space medial compartment and patella femoral compartment       IMPRESSION:      ICD-10-CM ICD-9-CM    1. Primary osteoarthritis of both knees  M17.0 715.16         PLAN:   1. Pt presents today with b/l knee pain due to MRI-documented primary OA and she notes great relief with the cortisone injections and PT. Advised her to use pain as her guide and gradually increase with activities. Pt is doing well today and can return as needed. Risk factors include: BMI>35, recent gastric bypass  2. No ultrasound exam indicated today  3. No cortisone injection indicated today  4. No Physical/Occupational Therapy indicated today  5. No diagnostic test indicated today:   6. No durable medical equipment indicated today  7.  No referral indicated today 8. No medications indicated today:   9. No Narcotic indicated today       RTC prn      Scribed by [address]) as dictated by Corine Canales MD    I, Dr. Corine Canales, confirm that all documentation is accurate.     Corine Canales M.D.   [facility]

## 2021-06-28 ENCOUNTER — OFFICE VISIT (OUTPATIENT)
Dept: ORTHOPEDIC SURGERY | Age: 41
End: 2021-06-28
Payer: COMMERCIAL

## 2021-06-28 VITALS
HEIGHT: 60 IN | BODY MASS INDEX: 36.12 KG/M2 | RESPIRATION RATE: 15 BRPM | WEIGHT: 184 LBS | OXYGEN SATURATION: 99 % | HEART RATE: 68 BPM

## 2021-06-28 DIAGNOSIS — M17.0 PRIMARY OSTEOARTHRITIS OF BOTH KNEES: Primary | ICD-10-CM

## 2021-06-28 PROCEDURE — 99212 OFFICE O/P EST SF 10 MIN: CPT | Performed by: ORTHOPAEDIC SURGERY

## 2021-06-29 ENCOUNTER — HOSPITAL ENCOUNTER (OUTPATIENT)
Dept: PHYSICAL THERAPY | Age: 41
Discharge: HOME | End: 2021-06-29
Payer: COMMERCIAL

## 2021-06-29 PROCEDURE — 97110 THERAPEUTIC EXERCISES: CPT

## 2021-06-29 PROCEDURE — 97112 NEUROMUSCULAR REEDUCATION: CPT

## 2021-06-29 NOTE — PROGRESS NOTES
PT DAILY TREATMENT NOTE     Patient Name: Eugene Lopez  Date:2021  : 1980  [x]  Patient  Verified  Payor: BLUE CROSS / Plan: [facility] PPO / Product Type: PPO /    In time: 405  Out time: 430  Total Treatment Time (min): 25  Visit #: 6 of 8    Medicare/BCBS Only   Total Timed Codes (min):  25 1:1 Treatment Time:  25       Treatment Area: Knee pain, right [M25.561]  Left knee pain [M25.562]    SUBJECTIVE  Pain Level (0-10 scale): 0  Any medication changes, allergies to medications, adverse drug reactions, diagnosis change, or new procedure performed?: [x] No    [] Yes (see summary sheet for update)  Subjective functional status/changes:   [] No changes reported  Patient reports she didn't require use of ice after last session, was able to jump rope, and workout with her daughter since last visit. She reports having a good follow up with MD yesterday as well.      OBJECTIVE    8 min Therapeutic Exercise:  [x] See flow sheet : + reassessment of goals    Rationale: increase ROM, increase strength and improve coordination to improve the patients ability to perform ADLs and self care tasks with greater ease     17 min Neuromuscular Re-education:  [x]  See flow sheet: bosu squats, wall sit with ball toss, wall jumps with ball toss, heel press on pilates chair, lunges with CHILANGO slides    Rationale: increase strength, improve coordination, improve balance and increase proprioception  to improve the patients ability to perform functional           With   [] TE   [] TA   [] neuro   [] other: Patient Education: [x] Review HEP    [] Progressed/Changed HEP based on:   [] positioning   [] body mechanics   [] transfers   [] heat/ice application    [] other:      Other Objective/Functional Measures: right flexion 122 degrees, left flexion 123 degrees with no discomfort      Pain Level (0-10 scale) post treatment: 0    ASSESSMENT/Changes in Function: Patient has made excellent progress with therapy thus far. She reports greater ease with daily tasks, improved ease with overall mobility, and recreational tasks. Patient now able to jog and jump rope for short durations without discomfort in knees. She demonstrates good improvements in bilateral knee flexion, quad and HS strength bilaterally, and control with form in lunges and squats. Patient would benefit from continued skilled PT to further progress dynamic balance, proprioceptive training, LE strengthening, and improve overall mobility to promote return to PLOF. Patient will continue to benefit from skilled PT services to modify and progress therapeutic interventions, address functional mobility deficits, address ROM deficits, address strength deficits, analyze and address soft tissue restrictions, analyze and cue movement patterns, analyze and modify body mechanics/ergonomics, assess and modify postural abnormalities, address imbalance/dizziness and instruct in home and community integration to attain remaining goals. []  See Plan of Care  [x]  See progress note/recertification  []  See Discharge Summary         Progress towards goals / Updated goals:  Short Term Goals: To be accomplished in 2 weeks:  1. Patient will report performance of home exercise program 4 of 7 days in the next week demonstrating compliance to therapy program and progress towards independent management of symptoms.             Eval: HEP provided               OBVNDVP: Met - reports compliance 6/08/2021     Long Term Goals: To be accomplished in 4 weeks:  1. Patient will report no difficulty with walking/jogging >2 miles to improve overall mobility and promote return to PLOF.             Eval: soreness following 2 mile walk              Current: Progressing 6/17/2021 - walked ~1 hour yesterday without much difficulty   2.  Patient will increase bilateral knee flexion to 125 degrees to improve transfers and stair negotiation.               Eval: 115 degrees bilaterally with tight/pulling   Current: Progressing 6/29/2021 - right flexion 122 degrees, left flexion 123 degrees with no discomfort   3. Patient will report no difficulty with negotiating stairs reciprocally without HR to improve overall mobility throughout home.              Eval: step-to pattern, difficulty with descending, bilateral HR use              Current: Progressing 6/22/2021 - some use of handrails if needed (not holding on for ascending), reciprocal pattern   4. Patient will improve bilateral quad and HS strength to 5/5 to perform all daily and recreational activities with greater ease.               Eval: right quad 4+/5, right HS 4/5, left quad 4/5, left HS 4+/5   Current: Progressing 6/29/2021 - right quad 5-/5, right HS 5-/5, left quad 5-/5, left HS 5-/5   5.  Patient will improve FOTO score to 68 to demonstrate return to prior level of function and to improve patients ability to perform household activities with greater ease.              Eval: 54    Current: Met 6/29/2021 - 77    PLAN  [x]  Upgrade activities as tolerated     []  Continue plan of care  []  Update interventions per flow sheet       []  Discharge due to:_  []  Other:_      Dorothy Lizama PT, DPT 6/29/2021  4:05 PM    Future Appointments   Date Time Provider Debra Flood   7/1/2021  1:00 PM Myles Bonner [facility]   7/14/2021  2:30 PM Elisabet Dailey PT [facility]   7/16/2021 11:30 AM Elisabet Dailey PT [facility]   7/20/2021  3:45 PM Myles Bonner [facility]   7/22/2021  4:45 PM Myles Bonner [facility]   7/27/2021  3:00 PM Myles Bonner [facility]   7/29/2021  4:45 PM Mary Ellen Mares [facility]

## 2021-06-29 NOTE — PROGRESS NOTES
[facility]  [address]  [place], [address]  [phone] fax    Physical Therapy Progress Note  Patient name: Lyndsey Cosme Start of Care: 2021   Referral source: Lakisha Nixon,* : 1980   Medical/Treatment Diagnosis: Knee pain, right [M25.561]  Left knee pain [M25.562]  Payor: [place] / Plan: [facility] PPO / Product Type: PPO /  Onset Date:, exacerbation 2020     Prior Hospitalization: see medical history Provider#: [id number]   Medications: Verified on Patient Summary List     Comorbidities: arthritis, BMI >30, prior surgery (gastric bypass)    Prior Level of Function: active with kids including swimming, playing sports, jogging/walk, fully independent   Visits from Start of Care: 6    Missed Visits: 1      Established Goals:          Short Term Goals: To be accomplished in 2 weeks:  1. Patient will report performance of home exercise program 4 of 7 days in the next week demonstrating compliance to therapy program and progress towards independent management of symptoms.             Eval: HEP provided               RASHI: Met - reports compliance      Long Term Goals: To be accomplished in 4 weeks:  1. Patient will report no difficulty with walking/jogging >2 miles to improve overall mobility and promote return to PLOF.             Eval: soreness following 2 mile walk              Current: Progressing - walked ~1 hour yesterday without much difficulty   2. Patient will increase bilateral knee flexion to 125 degrees to improve transfers and stair negotiation.               Eval: 115 degrees bilaterally with tight/pulling              Current: Progressing - right flexion 122 degrees, left flexion 123 degrees with no discomfort   3.  Patient will report no difficulty with negotiating stairs reciprocally without HR to improve overall mobility throughout home.              Eval: step-to pattern, difficulty with descending, bilateral HR use              Current: Progressing - some use of handrails if needed (not holding on for ascending), reciprocal pattern   4. Patient will improve bilateral quad and HS strength to 5/5 to perform all daily and recreational activities with greater ease.               Eval: right quad 4+/5, right HS 4/5, left quad 4/5, left HS 4+/5              Current: Progressing - right quad 5-/5, right HS 5-/5, left quad 5-/5, left HS 5-/5   5. Patient will improve FOTO score to 68 to demonstrate return to prior level of function and to improve patients ability to perform household activities with greater ease.              Eval: 54               Current: Met - 77    Key Functional Changes: improved functional mobility, improved strength, improved flexion AROM in bilateral knees     Updated Goals: to be achieved in 4 weeks:  1. Patient will report no difficulty with walking/jogging >2 miles to improve overall mobility and promote return to PLOF.             YH: walked ~1 hour yesterday without much difficulty   2. Patient will increase bilateral knee flexion to 125 degrees to improve transfers and stair negotiation.               PN: right flexion 122 degrees, left flexion 123 degrees with no discomfort   3. Patient will report no difficulty with negotiating stairs reciprocally without HR to improve overall mobility throughout home.              PN: some use of handrails if needed (not holding on for ascending), reciprocal pattern   4. Patient will improve bilateral quad and HS strength to 5/5 to perform all daily and recreational activities with greater ease.               PN: right quad 5-/5, right HS 5-/5, left quad 5-/5, left HS 5-/5     ASSESSMENT/RECOMMENDATIONS: Patient has made excellent progress with therapy thus far. She reports greater ease with daily tasks, improved ease with overall mobility, and recreational tasks.  Patient now able to jog and jump rope for short durations without discomfort in knees. She demonstrates good improvements in bilateral knee flexion, quad and HS strength bilaterally, and control with form in lunges and squats. Patient would benefit from continued skilled PT to further progress dynamic balance, proprioceptive training, LE strengthening, and improve overall mobility to promote return to PLOF. [x]Continue therapy per initial plan/protocol at a frequency of  2 x per week for 4 weeks  []Continue therapy with the following recommended changes:_____________________      _____________________________________________________________________  []Discontinue therapy progressing towards or have reached established goals  []Discontinue therapy due to lack of appreciable progress towards goals  []Discontinue therapy due to lack of attendance or compliance  []Await Physician's recommendations/decisions regarding therapy  []Other:________________________________________________________________    Thank you for this referral.    Destiny Nina, PT, DPT 6/29/2021 5:56 PM  NOTE TO PHYSICIAN:  PLEASE COMPLETE THE ORDERS BELOW AND   FAX TO [facility]: [phone]  If you are unable to process this request in 24 hours please contact our office: [phone] I have read the above report and request that my patient continue as recommended. ? I have read the above report and request that my patient continue therapy with the following changes/special instructions:__________________________________________________________  ? I have read the above report and request that my patient be discharged from therapy.     Physicians signature: ______________________________Date: ______Time:______     Ashlee Gautam

## 2021-07-01 ENCOUNTER — HOSPITAL ENCOUNTER (OUTPATIENT)
Dept: PHYSICAL THERAPY | Age: 41
Discharge: HOME | End: 2021-07-01
Payer: COMMERCIAL

## 2021-07-01 PROCEDURE — 97110 THERAPEUTIC EXERCISES: CPT

## 2021-07-01 PROCEDURE — 97112 NEUROMUSCULAR REEDUCATION: CPT

## 2021-07-01 NOTE — PROGRESS NOTES
PT DAILY TREATMENT NOTE     Patient Name: Guerline Aus  Date:2021  : 1980  [x]  Patient  Verified  Payor: BLUE CROSS / Plan: [facility] PPO / Product Type: PPO /    In time:100  Out time: 145  Total Treatment Time (min): 45  Visit #: 1 of 8    Medicare/BCBS Only   Total Timed Codes (min):  45 1:1 Treatment Time:  45       Treatment Area: Knee pain, right [M25.561]  Left knee pain [M25.562]    SUBJECTIVE  Pain Level (0-10 scale): 0  Any medication changes, allergies to medications, adverse drug reactions, diagnosis change, or new procedure performed?: [x] No    [] Yes (see summary sheet for update)  Subjective functional status/changes:   [] No changes reported  Patient reports she was able to jog a full block with her dog Tuesday night without increased pain or soreness.      OBJECTIVE    10 min Therapeutic Exercise:  [x] See flow sheet :   Rationale: increase ROM, increase strength and improve coordination to improve the patients ability to perform ADLs and self care tasks with greater ease     35 min Neuromuscular Re-education:  [x]  See flow sheet : [place] series (bosu step ups with pull downs, SLS press out), lateral lunges, airex SLS ball toss rebounder x 3 way, squat with ball toss at wall, static squat with basketball toss     Rationale: increase strength, improve coordination, improve balance and increase proprioception  to improve the patients ability to perform functional mobility with improved ease, quad strength, and stability           With   [] TE   [] TA   [] neuro   [] other: Patient Education: [x] Review HEP    [] Progressed/Changed HEP based on:   [] positioning   [] body mechanics   [] transfers   [] heat/ice application    [] other:      Other Objective/Functional Measures: modified exercises today to avoid jumping due to patient wearing dress     Pain Level (0-10 scale) post treatment: 0    ASSESSMENT/Changes in Function: Patient demonstrating good knee stabilization with all squatting exercises today. Patient continues to have mild instability with SLS balance on airex with rebounder ball toss x 3 directions on both LEs. Provided updated HEP for patient to continue progressing LE strengthening as tolerated. Continue to progress LE strengthening, dynamic balance, and plyo exercises as able to promote return to PLOF. Patient will continue to benefit from skilled PT services to modify and progress therapeutic interventions, address functional mobility deficits, address ROM deficits, address strength deficits, analyze and address soft tissue restrictions, analyze and cue movement patterns, analyze and modify body mechanics/ergonomics, assess and modify postural abnormalities, address imbalance/dizziness and instruct in home and community integration to attain remaining goals. []  See Plan of Care  []  See progress note/recertification  []  See Discharge Summary         Progress towards goals / Updated goals:  1. Patient will report no difficulty with walking/jogging >2 miles to improve overall mobility and promote return to PLOF.             OQ: walked ~1 hour yesterday without much difficulty   2. Patient will increase bilateral knee flexion to 125 degrees to improve transfers and stair negotiation.               PN: right flexion 122 degrees, left flexion 123 degrees with no discomfort   3. Patient will report no difficulty with negotiating stairs reciprocally without HR to improve overall mobility throughout home.              PN: some use of handrails if needed (not holding on for ascending), reciprocal pattern   4.  Patient will improve bilateral quad and HS strength to 5/5 to perform all daily and recreational activities with greater ease.               PN: right quad 5-/5, right HS 5-/5, left quad 5-/5, left HS 5-/5     PLAN  [x]  Upgrade activities as tolerated     []  Continue plan of care  []  Update interventions per flow sheet       []  Discharge due to:_  []  Other:_      Magdy Martel, PT, DPT 7/1/2021  1:03 PM    Future Appointments   Date Time Provider Debra Flood   7/14/2021  2:30 PM Reynolds Jeans, PT [facility]   7/16/2021 11:30 AM Reynolds Jeans, PT [facility]   7/20/2021  3:45 PM Cynthia Pu [facility]   7/22/2021  4:45 PM Cynthia Pu [facility]   7/27/2021  3:00 PM Cynthia Pu [facility]   7/29/2021  4:45 PM Elidia Lee [facility]

## 2021-07-20 ENCOUNTER — HOSPITAL ENCOUNTER (OUTPATIENT)
Dept: PHYSICAL THERAPY | Age: 41
Discharge: HOME | End: 2021-07-20
Payer: COMMERCIAL

## 2021-07-20 PROCEDURE — 97110 THERAPEUTIC EXERCISES: CPT

## 2021-07-20 PROCEDURE — 97112 NEUROMUSCULAR REEDUCATION: CPT

## 2021-07-22 ENCOUNTER — APPOINTMENT (OUTPATIENT)
Dept: PHYSICAL THERAPY | Age: 41
End: 2021-07-22
Payer: COMMERCIAL

## 2021-07-27 ENCOUNTER — APPOINTMENT (OUTPATIENT)
Dept: PHYSICAL THERAPY | Age: 41
End: 2021-07-27
Payer: COMMERCIAL

## 2021-07-29 ENCOUNTER — HOSPITAL ENCOUNTER (OUTPATIENT)
Dept: PHYSICAL THERAPY | Age: 41
Discharge: HOME | End: 2021-07-29
Payer: COMMERCIAL

## 2021-07-29 PROCEDURE — 97112 NEUROMUSCULAR REEDUCATION: CPT

## 2021-07-29 PROCEDURE — 97110 THERAPEUTIC EXERCISES: CPT

## 2021-07-29 NOTE — PROGRESS NOTES
Physical Therapy Discharge Instructions      [facility]  [address]  [place], [address]  [phone] fax    Patient: Felicia López  : 1980      Continue Home Exercise Program 1 times per day for 3 weeks, then decrease to 4-5 times per week      Continue with    [x] Ice  as needed 1-2 times per day     [] Heat           Follow up with MD:     [] Upon completion of therapy     [x] As needed      Recommendations:     []   Return to activity with home program    []   Return to activity with the following modifications:       []Post Rehab Program    []Join Independent aquatic program     []Return to/join local gym      Additional comments: Encouraged graded exposure with jogging, running, and more high impact activities.        Lan Bedoya, PT, DPT 2021 5:02 PM

## 2021-07-29 NOTE — PROGRESS NOTES
PT DAILY TREATMENT NOTE     Patient Name: Maribel Hutton  Date:2021  : 1980  [x]  Patient  Verified  Payor: BLUE CROSS / Plan: [facility] PPO / Product Type: PPO /    In time: 435  Out time: 536  Total Treatment Time (min): 61  Visit #: 3 of 8    Medicare/BCBS Only   Total Timed Codes (min):  61 1:1 Treatment Time:  61       Treatment Area: Knee pain, right [M25.561]  Left knee pain [M25.562]    SUBJECTIVE  Pain Level (0-10 scale): 0  Any medication changes, allergies to medications, adverse drug reactions, diagnosis change, or new procedure performed?: [x] No    [] Yes (see summary sheet for update)  Subjective functional status/changes:   [] No changes reported  Patient reports she was able to walk 5 miles and then go to [facility] the next day and walk all day without difficulty. She also reports changing vehicles and has helped significantly with discomfort in the left knee.      OBJECTIVE    31 min Therapeutic Exercise:  [x] See flow sheet : + reassessment of goals and review of updated HEP    Rationale: increase ROM, increase strength and improve coordination to improve the patients ability to perpof    30 min Neuromuscular Re-education:  [x]  See flow sheet: SLS balance on airex with rebounder toss, S/L Alan press, lunge on core align, bosu squats, bosu dynamic step up with KB    Rationale: increase strength, improve coordination, improve balance and increase proprioception  to improve the patients ability to perform functional mobility with stabilization and control           With   [] TE   [] TA   [] neuro   [] other: Patient Education: [x] Review HEP    [] Progressed/Changed HEP based on:   [] positioning   [] body mechanics   [] transfers   [] heat/ice application    [] other:      Other Objective/Functional Measures: review of updated HEP    Pain Level (0-10 scale) post treatment: 0    ASSESSMENT/Changes in Function: Patient presents for last visit under current plan of care. Patient reports significant progress since start of care. She demonstrates great bilateral quad and HS strength, improved bilateral knee AROM, improved ease with jogging/walking, and improved ease with stair negotiation. Patient is now able to walk ~5 miles without difficulty in bilateral knees, is able to perform walking lunges, and improved ease with jogging. Patient provided extensive HEP today to continue progressing LE strengthening and dynamic balance as able. Educated patient on graded exposure to more impact activities to determine tolerance. Patient leaving with no increase in pain or complaints. She has no questions or concerns and is agreeable to discharge at this time. []  See Plan of Care  []  See progress note/recertification  [x]  See Discharge Summary         Progress towards goals / Updated goals:  1. Patient will report no difficulty with walking/jogging >2 miles to improve overall mobility and promote return to PLOF.             PN: walked ~1 hour yesterday without much difficulty    Current: Met 7/29/2021 - walked 5 miles without difficulty, jogging ~5 minutes without difficulty   2. Patient will increase bilateral knee flexion to 125 degrees to improve transfers and stair negotiation.               PN: right flexion 122 degrees, left flexion 123 degrees with no discomfort    Current: Near met - no discomfort in bilateral knees  3. Patient will report no difficulty with negotiating stairs reciprocally without HR to improve overall mobility throughout home.              PN: some use of handrails if needed (not holding on for ascending), reciprocal pattern    Current: Met 7/29/2021 - no use of HR, mild discomfort (stretching) on left knee with descending stairs   4.  Patient will improve bilateral quad and HS strength to 5/5 to perform all daily and recreational activities with greater ease.               PN: right quad 5-/5, right HS 5-/5, left quad 5-/5, left HS 5-/5    Current: Near Met 7/29/2021 - bilateral quad strength 5/5, bilateral HS strength 5-/5     PLAN  []  Upgrade activities as tolerated     []  Continue plan of care  []  Update interventions per flow sheet       [x]  Discharge due to: progressing/met long term goals   []  Other:_      Peterson Car, PT, DPT 7/29/2021  4:44 PM    Future Appointments   Date Time Provider Debra Flood   7/29/2021  4:45 PM Evette Mendieta [facility]

## 2021-08-02 NOTE — PROGRESS NOTES
[facility]  [address]  [place], [address]  [phone] fax    Physical Therapy Discharge Summary  Patient name: Royal Jernigan Start of Care: 2021   Referral source: Tony Kimberlee,* : 1980   Medical/Treatment Diagnosis: Knee pain, right [M25.561]  Left knee pain [M25.562]  Payor: [place] / Plan: [facility] PPO / Product Type: PPO /  Onset Date:, exacerbation 2020     Prior Hospitalization: see medical history Provider#: [id number]   Medications: Verified on Patient Summary List     Comorbidities: arthritis, BMI >30, prior surgery (gastric bypass)    Prior Level of Function: active with kids including swimming, playing sports, jogging/walk, fully independent   Visits from Start of Care: 9    Missed Visits: 4  Reporting Period : 2021 to 2021      Summary of Care:  1. Patient will report no difficulty with walking/jogging >2 miles to improve overall mobility and promote return to PLOF.             PN: walked ~1 hour yesterday without much difficulty               Current: Met - walked 5 miles without difficulty, jogging ~5 minutes without difficulty   2. Patient will increase bilateral knee flexion to 125 degrees to improve transfers and stair negotiation.               PN: right flexion 122 degrees, left flexion 123 degrees with no discomfort               Current: Near met - no discomfort in bilateral knees  3. Patient will report no difficulty with negotiating stairs reciprocally without HR to improve overall mobility throughout home.              PN: some use of handrails if needed (not holding on for ascending), reciprocal pattern               Current: Met - no use of HR, mild discomfort (stretching) on left knee with descending stairs   4.  Patient will improve bilateral quad and HS strength to 5/5 to perform all daily and recreational activities with greater ease.               TH: DUSTIN quad 5-/5, right HS 5-/5, left quad 5-/5, left HS 5-/5               Current: Near Met - bilateral quad strength 5/5, bilateral HS strength 5-/5     ASSESSMENT/RECOMMENDATIONS: Patient presents for last visit under current plan of care. Patient reports significant progress since start of care. She demonstrates great bilateral quad and HS strength, improved bilateral knee AROM, improved ease with jogging/walking, and improved ease with stair negotiation. Patient is now able to walk ~5 miles without difficulty in bilateral knees, is able to perform walking lunges, and improved ease with jogging. Patient provided extensive HEP today to continue progressing LE strengthening and dynamic balance as able. Educated patient on graded exposure to more impact activities to determine tolerance. Patient leaving with no increase in pain or complaints. She has no questions or concerns and is agreeable to discharge at this time.      [x]Discontinue therapy: [x]Patient has reached or is progressing toward set goals      []Patient is non-compliant or has abdicated      []Due to lack of appreciable progress towards set goals    Krzysztof Damon, PT, DPT 7/29/2021 5:00 PM 30.5

## 2021-08-10 ENCOUNTER — DOCUMENTATION ONLY (OUTPATIENT)
Dept: SURGERY | Age: 41
End: 2021-08-10

## 2021-08-10 NOTE — PROGRESS NOTES
Per [facility] requirements;  E-mail and letter sent for follow up appointment. [facility] Bill Pradhan      Dear Patient,    Your health is our main concern. It is important for your health to have follow-up lab work and to see your surgeon at 3 months, 6 months and annually after your weight loss surgery. Additionally, the Department of bariatric Surgery at our hospital is a member of the Metabolic and Bariatric Surgery Accreditation and Quality Improvement Program [facility]). As a participant in this program, we gather information on the outcomes of our patients after surgery. Please call the office for a follow up appointment at [phone] [facility]) or [phone] [facility]). If you have moved out of the area or have changed surgeons please call us and let us know the name of your doctor. Your health and feedback are important to us. We greatly appreciate your response.        Thank you,  [facility] [address] [facility]

## 2021-10-26 ENCOUNTER — OFFICE VISIT (OUTPATIENT)
Dept: ORTHOPEDIC SURGERY | Age: 41
End: 2021-10-26
Payer: COMMERCIAL

## 2021-10-26 VITALS
BODY MASS INDEX: 32.2 KG/M2 | WEIGHT: 164 LBS | OXYGEN SATURATION: 98 % | HEIGHT: 60 IN | HEART RATE: 64 BPM | TEMPERATURE: 97 F

## 2021-10-26 DIAGNOSIS — M17.0 PRIMARY OSTEOARTHRITIS OF BOTH KNEES: Primary | ICD-10-CM

## 2021-10-26 PROCEDURE — 20611 DRAIN/INJ JOINT/BURSA W/US: CPT | Performed by: ORTHOPAEDIC SURGERY

## 2021-10-26 PROCEDURE — 99214 OFFICE O/P EST MOD 30 MIN: CPT | Performed by: ORTHOPAEDIC SURGERY

## 2021-10-26 RX ORDER — TRIAMCINOLONE ACETONIDE 40 MG/ML
80 INJECTION, SUSPENSION INTRA-ARTICULAR; INTRAMUSCULAR ONCE
Status: COMPLETED | OUTPATIENT
Start: 2021-10-26 | End: 2021-10-26

## 2021-10-26 RX ADMIN — TRIAMCINOLONE ACETONIDE 80 MG: 40 INJECTION, SUSPENSION INTRA-ARTICULAR; INTRAMUSCULAR at 16:31

## 2021-10-26 NOTE — PROGRESS NOTES
Page Benz  1980   Chief Complaint   Patient presents with    Knee Pain     right knee        HISTORY OF PRESENT ILLNESS  Page Benz is a 42 y.o. female who presents today for reevaluation of b/l knee pain. Patient rates pain as 1/10 today. At [address] on 5/25/2021, patient had a b/l knee cortisone injection which provided good relief until about 3-4 weeks ago. Has started running 5ks and half marathons as well as hiking. She notes this has exacerbated the pain and had pain with weight bearing. Had gastric bypass surgery in March. Patient denies any fever, chills, chest pain, shortness of breath or calf pain. The remainder of the review of systems is negative. There are no new illness or injuries to report since last seen in the office. There are no changes to medications, allergies, family or social history. PHYSICAL EXAM:   Visit Vitals  Pulse 64   Temp 97 °F (36.1 °C) (Temporal)   Ht 5' (1.524 m)   Wt 164 lb (74.4 kg)   SpO2 98%   BMI 32.03 kg/m²     The patient is a well-developed, well-nourished female   in no acute distress. The patient is alert and oriented times three. The patient is alert and oriented times three. Mood and affect are normal.  LYMPHATIC: lymph nodes are not enlarged and are within normal limits  SKIN: normal in color and non tender to palpation. There are no bruises or abrasions noted. NEUROLOGICAL: Motor sensory exam is within normal limits. Reflexes are equal bilaterally.  There is normal sensation to pinprick and light touch  MUSCULOSKELETAL:  Examination Left knee Right knee   Skin Intact Intact   Range of motion 0-120 0-120   Effusion + +   Medial joint line tenderness + +   Lateral joint line tenderness - -   Tenderness Pes Bursa - -   Tenderness insertion MCL - -   Tenderness insertion LCL - -   Williss - +   Patella crepitus + +   Patella grind - -   Lachman - -   Pivot shift - -   Anterior drawer - -   Posterior drawer - -   Varus stress - -   Valgus stress - -   Neurovascular Intact Intact   Calf Swelling and Tenderness to Palpation - -   Lucia's Test - -   Hamstring Cord Tightness - -      PROCEDURE: Bilateral knee Injection with Ultrasound Guidance  Indication:Bilateral knee pain/swelling    After sterile prep, 4 cc of Xylocaine and 1 cc of Kenalog were injected into the bilateral  knee. Ultrasound images captured using [employer] Ultrasound machine and scanned into patient's chart. [facility]  OFFICE PROCEDURE PROGRESS NOTE        Chart reviewed for the following:  Pratik Adkins M.D, have reviewed the History, Physical and updated the Allergic reactions for Coby Officer performed immediately prior to start of procedure:  Pratik Adkins M.D, have performed the following reviews on Margi Fulton prior to the start of the procedure:            * Patient was identified by name and date of birth   * Agreement on procedure being performed was verified  * Risks and Benefits explained to the patient  * Procedure site verified and marked as necessary  * Patient was positioned for comfort  * Consent was signed and verified     Time: 3:40 PM     Date of procedure: 2/9/2022    Procedure performed by:  Jonathan Mariscal M.D    Provider assisted by: (see medication administration)    How tolerated by patient: tolerated the procedure well with no complications    Comments: none    IMAGING: MRI of right knee dated 5/21/2021 was reviewed and read by Dr. Belia Nielsen:   IMPRESSION:  1. No acute osseous findings  2. No discrete meniscal or ligamentous injury  3. Mild tricompartmental osteoarthritic changes as described but no evidence of full thickness cartilage loss. 4. Mild patellar tendinosis  5. Small joint effusion        MRI of left knee dated 10/09/2020 was reviewed and read by Dr. Belia Nielsen:   IMPRESSION:  1. No discrete meniscal or ligamentous injury.   2. Mild proximal patellar tendinosis  3. Very mild focal subchondral trabecular edema medially in the medial femoral condyle and medial tibial plateau. This is nonspecific and may be reactive or mechanical in nature. 4. Small joint effuson        2 view xray images of bilateral knee taken in office on 5/12/2021 read and reviewed by DIXON Hart, reveal: decreased joint space medial compartment and patella femoral compartment       IMPRESSION:      ICD-10-CM ICD-9-CM    1. Primary osteoarthritis of both knees  M17.0 715.16 ARTHROCENTESIS ASPIR&/INJ MAJOR JT/BURSA W/US      triamcinolone acetonide (KENALOG-40) 40 mg/mL injection 80 mg      REFERRAL TO PHYSICAL THERAPY        PLAN:   1. Pt presents today with b/l knee pain due to MRI-documented degenerative arthritis with joint space narrowing and I am hopeful a b/l knee cortisone injection will provide relief. I emphasized the importance of using pain as a guide and not overdoing her activity. Try to limit running, squatting and kneeling. Continue with PT as needed. Return in 3 weeks if pain continues. Risk factors include: BMI>35, recent gastric bypass  2. No ultrasound exam indicated today  3. Yes cortisone injection indicated today B/L KNEE US   4. Yes Physical/Occupational Therapy indicated today  5. No diagnostic test indicated today:   6. No durable medical equipment indicated today  7. No referral indicated today   8. No medications indicated today:   9. No Narcotic indicated today       RTC prn      Scribed by Diego Garrido [address]) as dictated by Hellen Floyd MD    I, Dr. Hellen Floyd, confirm that all documentation is accurate.     Hellen Floyd M.D.   [facility]

## 2021-11-01 ENCOUNTER — HOSPITAL ENCOUNTER (OUTPATIENT)
Dept: PHYSICAL THERAPY | Age: 41
Discharge: HOME | End: 2021-11-01
Attending: ORTHOPAEDIC SURGERY
Payer: COMMERCIAL

## 2021-11-01 PROCEDURE — 97161 PT EVAL LOW COMPLEX 20 MIN: CPT

## 2021-11-01 PROCEDURE — 97110 THERAPEUTIC EXERCISES: CPT

## 2021-11-01 PROCEDURE — 97530 THERAPEUTIC ACTIVITIES: CPT

## 2021-11-01 NOTE — PROGRESS NOTES
[facility]  [address]  [place], [address]  [phone] fax    Plan of Care/ Statement of Necessity for Physical Therapy Services    Patient name: Gilbert Sadler Start of Care: 2021   Referral source: Gilda Roach,* : 1980    Medical Diagnosis: Knee pain, right [M25.561]  Left knee pain [M25.562]  Payor: [facility] / Plan: [facility] PPO / Product Type: PPO /  Onset Date: ~2-3 weeks ago exacerbation    Treatment Diagnosis: right knee pain, left knee pain   Prior Hospitalization: see medical history Provider#: [id number]   Medications: Verified on Patient summary List    Comorbidities: arthritis    Prior Level of Function: hiking, running ~10 miles/week, no limitations with daily activities      The Plan of Care and following information is based on the information from the initial evaluation. Assessment/ key information: Patient is a 42year old female presenting with acute on chronic bilateral knee pain (right > left) beginning ~2-3 weeks ago following a race. She reports most of the discomfort is in the right lateral knee. Patient did have a cortisone injection in the last week with some relief provided. Patient reports running, single leg balance/stabilization, and stair negotiation cause her some difficulty. Patient demonstrates excellent bilateral knee AROM, good hip flexion and quad strength bilaterally, decreased HS strength, decreased patellar mobility, decreased glute med and max strength bilaterally, and instability noted with single leg work. Patient with mild genu valgus noted with wall taps and single leg squatting to elevated surface, although denies pain with tasks. Patient's signs and symptoms consistent with OA. Patient would benefit from skilled PT 2x/week for 4 weeks to address the above limitations and promote return to PLOF and running with greater ease.      Evaluation Complexity History MEDIUM  Complexity : 1-2 comorbidities / personal factors will impact the outcome/ POC ; Examination MEDIUM Complexity : 3 Standardized tests and measures addressing body structure, function, activity limitation and / or participation in recreation  ;Presentation LOW Complexity : Stable, uncomplicated  ;Clinical Decision Making MEDIUM Complexity : FOTO score of 26-74  Overall Complexity Rating: LOW   Problem List: pain affecting function, decrease ROM, decrease strength, edema affecting function, impaired gait/ balance, decrease ADL/ functional abilitiies, decrease activity tolerance, decrease flexibility/ joint mobility and decrease transfer abilities   Treatment Plan may include any combination of the following: Therapeutic exercise, Therapeutic activities, Neuromuscular re-education, Physical agent/modality, Gait/balance training, Manual therapy, Patient education, Self Care training, Functional mobility training, Home safety training and Stair training  Patient / Family readiness to learn indicated by: asking questions, trying to perform skills and interest  Persons(s) to be included in education: patient (P)  Barriers to Learning/Limitations: None  Patient Goal (s): running  Patient Self Reported Health Status: excellent  Rehabilitation Potential: excellent    Short Term Goals: To be accomplished in 2 weeks:  1. Patient will report performance of home exercise program 4 of 7 days in the next week demonstrating compliance to therapy program and progress towards independent management of symptoms. Long Term Goals: To be accomplished in 4 weeks:  1. Patient will increase right SLS with ball catch to at least 30 seconds to demonstrate improved stabilization and return to PLOF,  2. Patient will increase bilateral glute med and max strength to at least 5-/5 to improve ease with stair negotiation and transfers with greater ease.    3. Patient will increase bilateral HS strength to at least 5-/5 to improve ease with running. 4. Patient will improve HS flexibility to <25 degrees to demonstrate improved ease with daily activities. 5. Patient will report no difficulty with running 2 miles to promote return to PLOF. 6. Patient will increase FOTO score to at least 74 to improve ease with daily and recreational activities. Frequency / Duration: Patient to be seen 2 times per week for 4 weeks. Patient/ Caregiver education and instruction: Diagnosis, prognosis, activity modification and exercises   [x]  Plan of care has been reviewed with ROJELIO Burns PT, DPT 11/1/2021 1:51 PM    ________________________________________________________________________    I certify that the above Therapy Services are being furnished while the patient is under my care. I agree with the treatment plan and certify that this therapy is necessary.     [de-identified] Signature:____________Date:_________TIME:________     Jodie Cha,*  ** Signature, Date and Time must be completed for valid certification **    Please sign and return to [facility] [address]  [address]  [place], [address]  [phone] fax

## 2021-11-01 NOTE — PROGRESS NOTES
PT DAILY TREATMENT NOTE     Patient Name: Billie Hooper  Date:2021  : 1980  [x]  Patient  Verified  Payor: BLUE CROSS / Plan: [place] [id number] PPO / Product Type: PPO /    In time: 101  Out time:145  Total Treatment Time (min): 44  Visit #: 1 of 8    Medicare/BCBS Only   Total Timed Codes (min):  23 1:1 Treatment Time:  44       Treatment Area: Knee pain, right [M25.561]  Left knee pain [M25.562]    SUBJECTIVE  Pain Level (0-10 scale): 2  Any medication changes, allergies to medications, adverse drug reactions, diagnosis change, or new procedure performed?: [x] No    [] Yes (see summary sheet for update)  Subjective functional status/changes:   [] No changes reported  Patient reports right > left knee pain beginning ~2-3 weeks ago after attempting to run a 1/2 marathon. She has recently started running consistently (last ~6 months) and has completed 8 races since August. Patient reports she had immediate sharp pain ~1.5 miles in and had to stop. She had a previous bout of therapy ~2-3 months ago that provided great relief in knee pain. Patient does report having cortisone injections last week with some relief noted. She reports right lateral knee pain as the primary concern. Of note, running, ascending stairs, and single leg balance provided difficulty/pain. She reports some relief with ice. OBJECTIVE    21 min [x]Eval                  []Re-Eval       13 min Therapeutic Exercise:  [x] See flow sheet:   Rationale: increase ROM, increase strength and improve coordination to improve the patients ability to perform ADLs and self care tasks with greater ease     10 min Therapeutic Activity:  [x]  See flow sheet : Education provided on pathology, findings, and treatment plan of care. Education provided on minimizing activities due to pain response and use of CP as needed for symptom management.     Rationale: increase strength and improve coordination  to improve the patients ability to perform functional tasks with greater ease        With   [] TE   [] TA   [] neuro   [] other: Patient Education: [x] Review HEP    [] Progressed/Changed HEP based on:   [] positioning   [] body mechanics   [] transfers   [] heat/ice application    [] other:      Other Objective/Functional Measures: see paper chart for evaluation form     Pain Level (0-10 scale) post treatment: 2    ASSESSMENT/Changes in Function: Patient is a 42year old female presenting with acute on chronic bilateral knee pain (right > left) beginning ~2-3 weeks ago following a race. She reports most of the discomfort is in the right lateral knee. Patient did have a cortisone injection in the last week with some relief provided. Patient reports running, single leg balance/stabilization, and stair negotiation cause her some difficulty. Patient demonstrates excellent bilateral knee AROM, good hip flexion and quad strength bilaterally, decreased HS strength, decreased patellar mobility, decreased glute med and max strength bilaterally, and instability noted with single leg work. Patient with mild genu valgus noted with wall taps and single leg squatting to elevated surface, although denies pain with tasks. Patient would benefit from skilled PT 2x/week for 4 weeks to address the above limitations and promote return to PLOF and running with greater ease. Patient will continue to benefit from skilled PT services to modify and progress therapeutic interventions, address functional mobility deficits, address ROM deficits, address strength deficits, analyze and address soft tissue restrictions, analyze and cue movement patterns, analyze and modify body mechanics/ergonomics, assess and modify postural abnormalities, address imbalance/dizziness and instruct in home and community integration to attain remaining goals.      [x]  See Plan of Care  []  See progress note/recertification  []  See Discharge Summary         Progress towards goals / Updated goals:  Short Term Goals: To be accomplished in 2 weeks:  1. Patient will report performance of home exercise program 4 of 7 days in the next week demonstrating compliance to therapy program and progress towards independent management of symptoms. Eval: HEP provided and instructed     Long Term Goals: To be accomplished in 4 weeks:  1. Patient will increase right SLS with ball catch to at least 30 seconds to demonstrate improved stabilization and return to PLOF. Eval: instability noted on left SLS   2. Patient will increase bilateral glute med and max strength to at least 5-/5 to improve ease with stair negotiation and transfers with greater ease. Eval: right abduction 4/5, right extension 4/5, left abduction 4+/5, left extension 4/5   3. Patient will increase bilateral HS strength to at least 5-/5 to improve ease with running. Eval: 4+/5 bilaterally   4. Patient will improve HS flexibility to <25 degrees to demonstrate improved ease with daily activities. Eval: right 45 degrees   5. Patient will report no difficulty with running 2 miles to promote return to PLOF. Eval: currently avoiding due to pain  6. Patient will increase FOTO score to at least 74 to improve ease with daily and recreational activities.     Eval: 62    PLAN  [x]  Upgrade activities as tolerated     []  Continue plan of care  []  Update interventions per flow sheet       []  Discharge due to:_  []  Other:_      Wilfredo Kulkarni, PT, DPT 11/1/2021  1:51 PM    Future Appointments   Date Time Provider Debra Flood   12/10/2021  9:00 AM [facility]

## 2021-11-03 ENCOUNTER — HOSPITAL ENCOUNTER (OUTPATIENT)
Dept: PHYSICAL THERAPY | Age: 41
Discharge: HOME | End: 2021-11-03
Attending: ORTHOPAEDIC SURGERY
Payer: COMMERCIAL

## 2021-11-03 PROCEDURE — 97140 MANUAL THERAPY 1/> REGIONS: CPT

## 2021-11-03 PROCEDURE — 97110 THERAPEUTIC EXERCISES: CPT

## 2021-11-03 NOTE — PROGRESS NOTES
PT DAILY TREATMENT NOTE     Patient Name: Phil Martin  Date:11/3/2021  : 1980  [x]  Patient  Verified  Payor: BLUE CROSS / Plan: [employer] [id number] PPO / Product Type: PPO /    In time: 3:53 PM  Out time:4:35 PM  Total Treatment Time (min): 42  Visit #: 2 of 8    Medicare/BCBS Only   Total Timed Codes (min):  42 1:1 Treatment Time:  42       Treatment Area: Knee pain, right [M25.561]  Left knee pain [M25.562]    SUBJECTIVE  Pain Level (0-10 scale): 2  Any medication changes, allergies to medications, adverse drug reactions, diagnosis change, or new procedure performed?: [x] No    [] Yes (see summary sheet for update)  Subjective functional status/changes:   [] No changes reported  Patient arrived in high heels, states she did not have time to go home for proper shoes. Otherwise reporting no changes or difficulty since initial evaluation. Will wear tennis shoes next session. OBJECTIVE    34 min Therapeutic Exercise:  [x] See flow sheet :   Rationale: increase ROM and increase strength to improve the patients ability to perform ADLs and return to recreational tasks with greater ease. 8 min Manual Therapy:    Supine - purnima patellar mobilizations Grade III, all planes though emphasis on lateral mobilization   The manual therapy interventions were performed at a separate and distinct time from the therapeutic activities interventions. Rationale: decrease pain, increase ROM to improve tolerance to ambulation and stair negotiation.            With   [] TE   [] TA   [] neuro   [x] other: Patient Education: 4 min - [x] Review HEP    [] Progressed/Changed HEP based on:   [] positioning   [] body mechanics   [] transfers   [] heat/ice application    [x] other: Education on waiting to return to fitness center exercise until therapy has progressed and able to establish exercise plan that will not exacerbate symptoms     Other Objective/Functional Measures:   Exercise program initiated, limited in completion of all exercises due to lack of appropriate foot wear   Limited patellar mobility observed upon mobilization    Pain Level (0-10 scale) post treatment: 2    ASSESSMENT/Changes in Function: Patient appropriately challenged with exercises completed. Cues required to limit functional valgus. Patient will continue to benefit from skilled PT services to modify and progress therapeutic interventions, address functional mobility deficits, address ROM deficits, address strength deficits, analyze and address soft tissue restrictions and analyze and cue movement patterns to attain remaining goals. Progress towards goals / Updated goals:  Short Term Goals: To be accomplished in 2 weeks:  1. Patient will report performance of home exercise program 4 of 7 days in the next week demonstrating compliance to therapy program and progress towards independent management of symptoms. Eval: HEP provided and instructed    Current: 11/3/2021 - Progressing - reports initiating at home     Long Term Goals: To be accomplished in 4 weeks:  1. Patient will increase right SLS with ball catch to at least 30 seconds to demonstrate improved stabilization and return to PLOF. Eval: instability noted on left SLS   2. Patient will increase bilateral glute med and max strength to at least 5-/5 to improve ease with stair negotiation and transfers with greater ease. Eval: right abduction 4/5, right extension 4/5, left abduction 4+/5, left extension 4/5   3. Patient will increase bilateral HS strength to at least 5-/5 to improve ease with running. Eval: 4+/5 bilaterally   4. Patient will improve HS flexibility to <25 degrees to demonstrate improved ease with daily activities. Eval: right 45 degrees   5. Patient will report no difficulty with running 2 miles to promote return to PLOF. Eval: currently avoiding due to pain  6.  Patient will increase FOTO score to at least 74 to improve ease with daily and recreational activities.                Eval: 62    PLAN  [x]  Upgrade activities as tolerated     []  Continue plan of care  []  Update interventions per flow sheet       []  Discharge due to:_  []  Other:_      Earnestine Jiang, PT 11/3/2021  3:59 PM    Future Appointments   Date Time Provider Debra Flood   11/10/2021  3:45 PM Landry SOARES, PT [facility]   11/11/2021  3:45 PM Ana Quevedo PT [facility]   11/15/2021  3:00 PM Geroldine Denver [facility]   11/17/2021  3:00 PM Sony Madera [facility]   11/23/2021  3:00 PM Sony Madera [facility]   11/29/2021  7:00 AM Anuradha Rodriguez [facility]   12/1/2021  3:45 PM Anuradha Dirk [facility]   12/10/2021  9:00 AM [facility]

## 2021-11-10 ENCOUNTER — HOSPITAL ENCOUNTER (OUTPATIENT)
Dept: PHYSICAL THERAPY | Age: 41
Discharge: HOME | End: 2021-11-10
Attending: ORTHOPAEDIC SURGERY
Payer: COMMERCIAL

## 2021-11-10 PROCEDURE — 97110 THERAPEUTIC EXERCISES: CPT

## 2021-11-10 PROCEDURE — 97112 NEUROMUSCULAR REEDUCATION: CPT

## 2021-11-10 PROCEDURE — 97140 MANUAL THERAPY 1/> REGIONS: CPT

## 2021-11-10 NOTE — PROGRESS NOTES
PT DAILY TREATMENT NOTE 10-18    Patient Name: Jasmine Alvarez   Date:11/10/2021  : 1980  [x]  Patient  Verified  Payor: BLUE CROSS / Plan: [facility] PPO / Product Type: PPO /    In time:348  Out time:437  Total Treatment Time (min): 49  Visit #: 3 of 8    Medicare/BCBS Only   Total Timed Codes (min):  49 1:1 Treatment Time:  49       Treatment Area: Knee pain, right [M25.561]  Left knee pain [M25.562]    SUBJECTIVE  Pain Level (0-10 scale): 0  Any medication changes, allergies to medications, adverse drug reactions, diagnosis change, or new procedure performed?: [x] No    [] Yes (see summary sheet for update)  Subjective functional status/changes:   [] No changes reported  It still hurts a little going up the stairs. OBJECTIVE       31 min Therapeutic Exercise:  [] See flow sheet :   Rationale: increase ROM and increase strength to improve the patients ability to perform daily activities      10 min Neuromuscular Re-education:  []  See flow sheet :   Rationale: increase strength, improve coordination, improve balance and increase proprioception  to improve the patients stability for daily activities     8 min Manual Therapy:  STM right VL and ITB; grade 4 lateral patellar mobs B   The manual therapy interventions were performed at a separate and distinct time from the therapeutic activities interventions. Rationale: decrease pain, increase ROM, increase tissue extensibility and decrease trigger points to perform daily activities   With   [] TE   [] TA   [] neuro   [] other: Patient Education: [x] Review HEP    [] Progressed/Changed HEP based on:   [] positioning   [] body mechanics   [] transfers   [] heat/ice application    [] other:      Other Objective/Functional Measures:      Pain Level (0-10 scale) post treatment: 0    ASSESSMENT/Changes in Function: Significant mm restrictions in right VL and ITB. Encouraged patient to get massage to address soft tissue restrictions. Patient will continue to benefit from skilled PT services to modify and progress therapeutic interventions, address strength deficits, analyze and address soft tissue restrictions and analyze and cue movement patterns to attain remaining goals. []  See Plan of Care  []  See progress note/recertification  []  See Discharge Summary         Progress towards goals / Updated goals:  Short Term Goals: To be accomplished in 2 weeks:  1. Patient will report performance of home exercise program 4 of 7 days in the next week demonstrating compliance to therapy program and progress towards independent management of symptoms.             Eval: HEP provided and instructed               Current: 11/3/2021 - Progressing - reports initiating at home  [address] be accomplished in 4 weeks:  1. Patient will increase right SLS with ball catch to at least 30 seconds to demonstrate improved stabilization and return to PLOF.             Eval: instability noted on left SLS   2. Patient will increase bilateral glute med and max strength to at least 5-/5 to improve ease with stair negotiation and transfers with greater ease.               Eval: right abduction 4/5, right extension 4/5, left abduction 4+/5, left extension 4/5   3. Patient will increase bilateral HS strength to at least 5-/5 to improve ease with running.               Eval: 4+/5 bilaterally   4. Patient will improve HS flexibility to <25 degrees to demonstrate improved ease with daily activities.                Eval: right 45 degrees   5. Patient will report no difficulty with running 2 miles to promote return to PLOF.             Eval: currently avoiding due to pain  6. Patient will increase FOTO score to at least 74 to improve ease with daily and recreational activities.                Eval: 62    PLAN  []  Upgrade activities as tolerated     []  Continue plan of care  []  Update interventions per flow sheet       []  Discharge due to:_  []  Other:_ Dustin Egan, MPT, CMTPT 11/10/2021  9:24 AM    Future Appointments   Date Time Provider Debra Flood   11/10/2021  3:45 PM Carlos SOARES, PT [facility]   11/11/2021  3:45 PM Richelle Sheets, PT [facility]   11/15/2021  3:00 PM Maria Isabel Jules [facility]   11/17/2021  3:00 PM Mackenzie Desai [facility]   11/23/2021  3:00 PM Mackenzie Desai [facility]   11/29/2021  7:00 AM Tenna Gentle [facility]   12/1/2021  3:45 PM Tenna Gentle [facility]   12/10/2021  9:00 AM [facility]

## 2021-11-11 ENCOUNTER — HOSPITAL ENCOUNTER (OUTPATIENT)
Dept: PHYSICAL THERAPY | Age: 41
Discharge: HOME | End: 2021-11-11
Attending: ORTHOPAEDIC SURGERY
Payer: COMMERCIAL

## 2021-11-11 PROCEDURE — 97110 THERAPEUTIC EXERCISES: CPT

## 2021-11-11 PROCEDURE — 97140 MANUAL THERAPY 1/> REGIONS: CPT

## 2021-11-11 PROCEDURE — 97112 NEUROMUSCULAR REEDUCATION: CPT

## 2021-11-11 NOTE — PROGRESS NOTES
PT DAILY TREATMENT NOTE     Patient Name: Haris Samples  Date:2021  : 1980  [x]  Patient  Verified  Payor: BLUE CROSS / Plan: [facility] PPO / Product Type: PPO /    In time: 3:45 PM  Out time: 4:39 PM  Total Treatment Time (min): 54  Visit #: 4 of 8    Medicare/BCBS Only   Total Timed Codes (min):  54 1:1 Treatment Time:  54       Treatment Area: Knee pain, right [M25.561]  Left knee pain [M25.562]    SUBJECTIVE  Pain Level (0-10 scale): 0  Any medication changes, allergies to medications, adverse drug reactions, diagnosis change, or new procedure performed?: [x] No    [] Yes (see summary sheet for update)  Subjective functional status/changes:   [] No changes reported  Reporting some increased soreness after last session. OBJECTIVE    34 min Therapeutic Exercise:  [x] See flow sheet :   Rationale: increase ROM and increase strength to improve the patients ability to perform ADLs. 12 min Neuromuscular Re-education:  [x]  See flow sheet :   Rationale: increase strength, improve coordination and increase proprioception  to improve the patients ability to navigate dynamic surfaces and environments. 10 min Manual Therapy:    Supine - STM right VL and ITB; grade 4 lateral patellar mobs B   The manual therapy interventions were performed at a separate and distinct time from the therapeutic activities interventions. Rationale: increase ROM, increase tissue extensibility and decrease trigger points to increase tolerance to recreational running.      With   [x] TE   [] TA   [] neuro   [] other: Patient Education: [] Review HEP    [] Progressed/Changed HEP based on:   [] positioning   [x] body mechanics   [] transfers   [] heat/ice application    [] other:      Other Objective/Functional Measures:   Improved right patellar mobility  Significant soft tissue restrictions right vastus lateralis     Pain Level (0-10 scale) post treatment: 0    ASSESSMENT/Changes in Function: Patient with good mechanics in squat and lunge activities, no functional valgus noted. Soft tissue restriction moderately improved with manual intervention. Patient will continue to benefit from skilled PT services to modify and progress therapeutic interventions, address functional mobility deficits, address ROM deficits, address strength deficits, analyze and address soft tissue restrictions, analyze and cue movement patterns and analyze and modify body mechanics/ergonomics to attain remaining goals. Progress towards goals / Updated goals:  Short Term Goals: To be accomplished in 2 weeks:  1. Patient will report performance of home exercise program 4 of 7 days in the next week demonstrating compliance to therapy program and progress towards independent management of symptoms.             Eval: HEP provided and instructed               DVADGGQ: 11/3/2021 - Progressing - reports initiating at home  [address] be accomplished in 4 weeks:  1. Patient will increase right SLS with ball catch to at least 30 seconds to demonstrate improved stabilization and return to PLOF.             Eval: instability noted on left SLS   2. Patient will increase bilateral glute med and max strength to at least 5-/5 to improve ease with stair negotiation and transfers with greater ease.               Eval: right abduction 4/5, right extension 4/5, left abduction 4+/5, left extension 4/5   3. Patient will increase bilateral HS strength to at least 5-/5 to improve ease with running.               Eval: 4+/5 bilaterally   4. Patient will improve HS flexibility to <25 degrees to demonstrate improved ease with daily activities.                Eval: right 45 degrees   5. Patient will report no difficulty with running 2 miles to promote return to PLOF.             Eval: currently avoiding due to pain  6.  Patient will increase FOTO score to at least 74 to improve ease with daily and recreational activities.                Eval: 62     PLAN  [x]  Upgrade activities as tolerated     []  Continue plan of care  []  Update interventions per flow sheet       []  Discharge due to:_  []  Other:_      Margueritte Goltz, PT 11/11/2021  3:24 PM    Future Appointments   Date Time Provider Debra Flood   11/11/2021  3:45 PM Daya Santoyo, PT [facility]   11/15/2021  3:00 PM Brenda Rios [facility]   11/17/2021  3:00 PM Claudis Cart [facility]   11/23/2021  3:00 PM Claudis Cart [facility]   11/29/2021  7:00 AM Alanis Paul [facility]   12/1/2021  3:45 PM Alanis Paul [facility]   12/10/2021  9:00 AM [facility]

## 2021-11-15 ENCOUNTER — HOSPITAL ENCOUNTER (OUTPATIENT)
Dept: PHYSICAL THERAPY | Age: 41
Discharge: HOME | End: 2021-11-15
Attending: ORTHOPAEDIC SURGERY
Payer: COMMERCIAL

## 2021-11-15 PROCEDURE — 97530 THERAPEUTIC ACTIVITIES: CPT

## 2021-11-15 PROCEDURE — 97112 NEUROMUSCULAR REEDUCATION: CPT

## 2021-11-15 PROCEDURE — 97110 THERAPEUTIC EXERCISES: CPT

## 2021-11-15 NOTE — PROGRESS NOTES
PT DAILY TREATMENT NOTE     Patient Name: Dionne Waller  Date:11/15/2021  : 1980  [x]  Patient  Verified  Payor: BLUE CROSS / Plan: [facility] PPO / Product Type: PPO /    In time:3:00  Out time:3:45  Total Treatment Time (min): 45  Visit #: 5 of 8    Medicare/BCBS Only   Total Timed Codes (min):  45 1:1 Treatment Time:  45       Treatment Area: Knee pain, right [M25.561]  Left knee pain [M25.562]    SUBJECTIVE  Pain Level (0-10 scale): 0  Any medication changes, allergies to medications, adverse drug reactions, diagnosis change, or new procedure performed?: [x] No    [] Yes (see summary sheet for update)  Subjective functional status/changes:   [] No changes reported  The pt inquires about dry needling for her knee pain as she reports she is looking to try anything that will help her with her half marathon this weekend    OBJECTIVE    10 min Therapeutic Exercise:  [] See flow sheet :   Rationale: increase ROM and increase strength to improve the patients ability to perform daily tasks and self care    23 min Therapeutic Activity:  []  See flow sheet : including assessment of soft tissue restrictions through B knees for education on DN; also including TM jog to assess for onset of symptoms   Rationale: increase ROM and increase strength  to improve the patients ability to progress with PT or modify POC as needed     12 min Neuromuscular Re-education:  []  See flow sheet :   Rationale: increase strength and improve coordination  to improve the patients ability to perform closed chain quad activation with improved stability      With   [] TE   [] TA   [] neuro   [] other: Patient Education: [x] Review HEP    [] Progressed/Changed HEP based on:   [] positioning   [] body mechanics   [] transfers   [] heat/ice application    [] other:      Other Objective/Functional Measures:      Pain Level (0-10 scale) post treatment: 0    ASSESSMENT/Changes in Function: The pt demonstrates quite good quad flexibility with some hamstring restrictions noted, non painful. She demonstrates TrP through hamstrings and quads left > right but no pain reproduction. Good hamstring strength noted bilaterally. Pt able to perform eccentric step downs, static lunges and line hops without any onset of knee pain. TM jog for 0.5 miles without onset of knee pain. Discussed with patient that TrP don't seem to be major component of pain source currently. Will instruct on patellar KT taping to attempt to reduce pain with running. Patient will continue to benefit from skilled PT services to modify and progress therapeutic interventions, address functional mobility deficits, address ROM deficits, address strength deficits, analyze and address soft tissue restrictions, analyze and cue movement patterns and analyze and modify body mechanics/ergonomics to attain remaining goals. []  See Plan of Care  []  See progress note/recertification  []  See Discharge Summary         Progress towards goals / Updated goals:  Short Term Goals: To be accomplished in 2 weeks:  1. Patient will report performance of home exercise program 4 of 7 days in the next week demonstrating compliance to therapy program and progress towards independent management of symptoms.             Eval: HEP provided and instructed               GHADA: 11/3/2021 - Progressing - reports initiating at home  [address] be accomplished in 4 weeks:  1. Patient will increase right SLS with ball catch to at least 30 seconds to demonstrate improved stabilization and return to PLOF.             Eval: instability noted on left SLS   2. Patient will increase bilateral glute med and max strength to at least 5-/5 to improve ease with stair negotiation and transfers with greater ease.               Eval: right abduction 4/5, right extension 4/5, left abduction 4+/5, left extension 4/5   3.  Patient will increase bilateral HS strength to at least 5-/5 to improve ease with running.               Eval: 4+/5 bilaterally    Current: Met 5/5 Bilaterally 11/15/2021  4. Patient will improve HS flexibility to <25 degrees to demonstrate improved ease with daily activities.                Eval: right 45 degrees   5. Patient will report no difficulty with running 2 miles to promote return to PLOF.             Eval: currently avoiding due to pain  6. Patient will increase FOTO score to at least 74 to improve ease with daily and recreational activities.                Eval: 62     PLAN  []  Upgrade activities as tolerated     []  Continue plan of care  []  Update interventions per flow sheet       []  Discharge due to:_  []  Other:_      Ora An DPT CMTPT 11/15/2021  3:16 PM    Future Appointments   Date Time Provider Debra Flood   11/17/2021  3:00 PM Avelinoryan Zazueta [facility]   11/23/2021  3:00 PM Avelino Skipper [facility]   11/29/2021  7:00 [facility]   12/1/2021  3:45 PM Christen GALVAN [facility]   12/10/2021  9:00 AM [facility]

## 2021-11-17 ENCOUNTER — HOSPITAL ENCOUNTER (OUTPATIENT)
Dept: PHYSICAL THERAPY | Age: 41
Discharge: HOME | End: 2021-11-17
Attending: ORTHOPAEDIC SURGERY
Payer: COMMERCIAL

## 2021-11-17 PROCEDURE — 97112 NEUROMUSCULAR REEDUCATION: CPT

## 2021-11-17 PROCEDURE — 97530 THERAPEUTIC ACTIVITIES: CPT

## 2021-11-17 PROCEDURE — 97110 THERAPEUTIC EXERCISES: CPT

## 2021-11-17 NOTE — PROGRESS NOTES
PT DAILY TREATMENT NOTE     Patient Name: Clayton Ragland  Date:2021  : 1980  [x]  Patient  Verified  Payor: BLUE CROSS / Plan: [facility] PPO / Product Type: PPO /    In time: 301  Out time: 345  Total Treatment Time (min): 44  Visit #: 6 of 8    Medicare/BCBS Only   Total Timed Codes (min):  44 1:1 Treatment Time:  44       Treatment Area: Knee pain, right [M25.561]  Left knee pain [M25.562]    SUBJECTIVE  Pain Level (0-10 scale): 0  Any medication changes, allergies to medications, adverse drug reactions, diagnosis change, or new procedure performed?: [x] No    [] Yes (see summary sheet for update)  Subjective functional status/changes:   [] No changes reported  Patient reports she has a 5K and 1/2 marathon this weekend, reporting she will walk if she begins feeling discomfort/pain.      OBJECTIVE    15 min Therapeutic Exercise:  [x] See flow sheet :   Rationale: increase ROM, increase strength and improve coordination to improve the patients ability to perform ADLs and self care tasks with greater ease     15 min Therapeutic Activity:  [x]  See flow sheet : squat jumps, line jumping forward/lateral, splint lunge squats   Rationale: increase strength and improve coordination  to improve the patients ability to perform      14 min Neuromuscular Re-education:  [x]  See flow sheet : SLS balance on airex with rebounder, eccentric step downs; patellar lift taping technique to left knee    Rationale: increase strength, improve coordination, improve balance and increase proprioception  to improve the patients ability to perform functional tasks with improved           With   [] TE   [] TA   [] neuro   [] other: Patient Education: [x] Review HEP    [] Progressed/Changed HEP based on:   [] positioning   [] body mechanics   [] transfers   [] heat/ice application    [] other:      Other Objective/Functional Measures: added splint lunge jumps with good tolerance, L2 jump with rebound     Pain Level (0-10 scale) post treatment: 0    ASSESSMENT/Changes in Function: Patient reporting good tolerance/relief with patellar lift taping technique applied. Patient challenged with L2 jumps with squat into rebound today noting slight right genu valgus with correction through visual feedback with mirror. Patient with significant improvements in HS flexibility. Continue progressing plyometrics and LE stabilization as able to improve ease with recreational activities. Patient will continue to benefit from skilled PT services to modify and progress therapeutic interventions, address functional mobility deficits, address ROM deficits, address strength deficits, analyze and address soft tissue restrictions, analyze and cue movement patterns, analyze and modify body mechanics/ergonomics, assess and modify postural abnormalities, address imbalance/dizziness and instruct in home and community integration to attain remaining goals. []  See Plan of Care  []  See progress note/recertification  []  See Discharge Summary         Progress towards goals / Updated goals:  Short Term Goals: To be accomplished in 2 weeks:  1. Patient will report performance of home exercise program 4 of 7 days in the next week demonstrating compliance to therapy program and progress towards independent management of symptoms.             Eval: HEP provided and instructed               HTBJXH/3/2021 - Progressing - reports initiating at home  [address] be accomplished in 4 weeks:  1. Patient will increase right SLS with ball catch to at least 30 seconds to demonstrate improved stabilization and return to PLOF.               Eval: instability noted on left SLS    2. Patient will increase bilateral glute med and max strength to at least 5-/5 to improve ease with stair negotiation and transfers with greater ease.               Eval: right abduction 4/5, right extension 4/5, left abduction 4+/5, left extension 4/5   3. Patient will increase bilateral HS strength to at least 5-/5 to improve ease with running.               Eval: 4+/5 bilaterally               Current: Met 5/5 Bilaterally 11/15/2021  4. Patient will improve HS flexibility to <25 degrees to demonstrate improved ease with daily activities.                Eval: right 45 degrees    Current: met 11/17/2021 - lacking 8 degrees HS   5. Patient will report no difficulty with running 2 miles to promote return to PLOF.             Eval: currently avoiding due to pain  6. Patient will increase FOTO score to at least 74 to improve ease with daily and recreational activities.                Eval: 63     PLAN  []  Upgrade activities as tolerated     []  Continue plan of care  []  Update interventions per flow sheet       []  Discharge due to:_  []  Other:_      Ginny Riedel, PT, DPT 11/17/2021  3:30 PM    Future Appointments   Date Time Provider Debra Flood   11/23/2021  3:00 PM Dash [facility]   11/29/2021  7:00 [facility]   12/1/2021  3:45 PM [facility]   12/10/2021  9:00 AM [facility] BARIATRIC DIETITIAN [facility]

## 2021-11-23 ENCOUNTER — HOSPITAL ENCOUNTER (OUTPATIENT)
Dept: PHYSICAL THERAPY | Age: 41
Discharge: HOME | End: 2021-11-23
Attending: ORTHOPAEDIC SURGERY
Payer: COMMERCIAL

## 2021-11-23 PROCEDURE — 97530 THERAPEUTIC ACTIVITIES: CPT

## 2021-11-23 PROCEDURE — 97110 THERAPEUTIC EXERCISES: CPT

## 2021-11-23 PROCEDURE — 97112 NEUROMUSCULAR REEDUCATION: CPT

## 2021-11-23 NOTE — PROGRESS NOTES
PT DAILY TREATMENT NOTE     Patient Name: Alexus Pulido  Date:2021  : 1980  [x]  Patient  Verified  Payor: BLUE CROSS / Plan: [facility] PPO / Product Type: PPO /    In time: 248  Out time: 335  Total Treatment Time (min): 47  Visit #: 7 of 8    Medicare/BCBS Only   Total Timed Codes (min):  47 1:1 Treatment Time:  47       Treatment Area: Knee pain, right [M25.561]  Left knee pain [M25.562]    SUBJECTIVE  Pain Level (0-10 scale): 0  Any medication changes, allergies to medications, adverse drug reactions, diagnosis change, or new procedure performed?: [x] No    [] Yes (see summary sheet for update)  Subjective functional status/changes:   [] No changes reported  Patient reports she did the 5K on Saturday and a half-marathon on . She reports some tenderness following the 5K, however she was able to beat her time for both the 5K and 1/2 marathon. Overall, she reports feeling good and did apply KTape to the knees.      OBJECTIVE    12 min Therapeutic Exercise:  [x] See flow sheet :   Rationale: increase ROM, increase strength and improve coordination to improve the patients ability to perform ADLs and self care tasks with greater ease     15 min Therapeutic Activity:  [x]  See flow sheet : line jumping forward and laterally, splint jump lunges, step downs; reviewed anatomy    Rationale: increase strength and improve coordination  to improve the patients ability to perform functional tasks with greater ease      20 min Neuromuscular Re-education:  [x]  See flow sheet : single leg on rockerboard with pertubations with rebounder toss (red), static squat on shuttle balance with ball toss, single leg hip hinge   Rationale: increase strength, improve coordination, improve balance and increase proprioception  to improve the patients ability to perform functional tasks with improved stabilization           With   [] TE   [] TA   [] neuro   [] other: Patient Education: [x] Review HEP    [] Progressed/Changed HEP based on:   [] positioning   [] body mechanics   [] transfers   [] heat/ice application    [] other:      Other Objective/Functional Measures: reformer scooter 3 red bands, shuttle balance and squats with ball toss, added rocker board single leg with rebounder toss, single leg squat on shuttle balance     Pain Level (0-10 scale) post treatment: 0    ASSESSMENT/Changes in Function: Patient able to complete all exercise progressions without reports of increased pain. Patient appropriately challenged with rebounder toss with rocker board pertubations, single leg squats on shuttle balance, and static squat on shuttle balance with 8# ball toss. Patient with good single leg stabilization noted today. Progress note due next visit. Patient will continue to benefit from skilled PT services to modify and progress therapeutic interventions, address functional mobility deficits, address ROM deficits, address strength deficits, analyze and address soft tissue restrictions, analyze and cue movement patterns, analyze and modify body mechanics/ergonomics, assess and modify postural abnormalities and instruct in home and community integration to attain remaining goals. []  See Plan of Care  []  See progress note/recertification  []  See Discharge Summary         Progress towards goals / Updated goals:  Short Term Goals: To be accomplished in 2 weeks:  1. Patient will report performance of home exercise program 4 of 7 days in the next week demonstrating compliance to therapy program and progress towards independent management of symptoms.             Eval: HEP provided and instructed               LRYFHFT: 11/3/2021 - Progressing - reports initiating at home  [address] be accomplished in 4 weeks:  1. Patient will increase right SLS with ball catch to at least 30 seconds to demonstrate improved stabilization and return to PLOF.               Eval: instability noted on left SLS    Current: progressing 11/23/2021 - on compliant surface, minimal difficulty   2. Patient will increase bilateral glute med and max strength to at least 5-/5 to improve ease with stair negotiation and transfers with greater ease.               Eval: right abduction 4/5, right extension 4/5, left abduction 4+/5, left extension 4/5   3. Patient will increase bilateral HS strength to at least 5-/5 to improve ease with running.               Eval: 4+/5 bilaterally               Current: Met 5/5 Bilaterally 11/15/2021  4. Patient will improve HS flexibility to <25 degrees to demonstrate improved ease with daily activities.                Eval: right 45 degrees               Current: met 11/17/2021 - lacking 8 degrees HS   5. Patient will report no difficulty with running 2 miles to promote return to PLOF.             Eval: currently avoiding due to pain   Current: progressing 11/23/2021 - minimal discomfort running 5K  6. Patient will increase FOTO score to at least 74 to improve ease with daily and recreational activities.                Eval: 63     PLAN  []  Upgrade activities as tolerated     [x]  Continue plan of care  []  Update interventions per flow sheet       []  Discharge due to:_  []  Other:_      [place], PT, DPT 11/23/2021  2:50 PM    Future Appointments   Date Time Provider Debra Flood   11/23/2021  3:00 PM Burnpardeep Barrow [facility]   11/29/2021  7:00 [facility]   12/1/2021  3:45 PM Ovidio [facility]PT [facility]   12/10/2021  9:00 AM [facility] BARIATRIC DIETITIAN [facility]

## 2021-11-29 ENCOUNTER — HOSPITAL ENCOUNTER (OUTPATIENT)
Dept: PHYSICAL THERAPY | Age: 41
Discharge: HOME | End: 2021-11-29
Attending: ORTHOPAEDIC SURGERY
Payer: COMMERCIAL

## 2021-11-29 PROCEDURE — 97112 NEUROMUSCULAR REEDUCATION: CPT

## 2021-11-29 PROCEDURE — 97530 THERAPEUTIC ACTIVITIES: CPT

## 2021-11-29 PROCEDURE — 97110 THERAPEUTIC EXERCISES: CPT

## 2021-11-29 NOTE — PROGRESS NOTES
[facility]  [address]  [place], [address]  [phone] fax    Physical Therapy Discharge Summary  Patient name: Page Benz Start of Care: 2021   Referral source: Megan Ambreen,* : 1980   Medical/Treatment Diagnosis: Knee pain, right [M25.561]  Left knee pain [M25.562]  Payor: [employer] / Plan: [facility] PPO / Product Type: PPO /  Onset Date: ~2-3 weeks prior to [facility] exacerbation     Prior Hospitalization: see medical history Provider#: [id number]   Medications: Verified on Patient Summary List    Comorbidities: arthritis    Prior Level of Function: hiking, running ~10 miles/week, no limitations with daily activities   Visits from Start of Care: 8    Missed Visits: 0  Reporting Period : 2021 to 2021      Summary of Care:  Short Term Goals: To be accomplished in 2 weeks:  1. Patient will report performance of home exercise program 4 of 7 days in the next week demonstrating compliance to therapy program and progress towards independent management of symptoms.             Eval: HEP provided and instructed               JOZTANU: Met - reports compliance     Long Term Goals: To be accomplished in 4 weeks:  1. Patient will increase right SLS with ball catch to at least 30 seconds to demonstrate improved stabilization and return to PLOF.             Eval: instability noted on left SLS               Current: progressing - on compliant surface, minimal difficulty   2. Patient will increase bilateral glute med and max strength to at least 5-/5 to improve ease with stair negotiation and transfers with greater ease.               Eval: right abduction 4/5, right extension 4/5, left abduction 4+/5, left extension 4/5               Current: Met - right abduction 5/5, right extension 5-/5, left abduction 5/5, left extension 5/5  3.  Patient will increase bilateral HS strength to at least 5-/5 to improve ease with running.               Eval: 4+/5 bilaterally               Current: Met 5/5 Bilaterally  4. Patient will improve HS flexibility to <25 degrees to demonstrate improved ease with daily activities.                Eval: right 45 degrees               Current: met - lacking 8 degrees HS   5. Patient will report no difficulty with running 2 miles to promote return to PLOF.             Eval: currently avoiding due to pain              Current: progressing - minimal discomfort running 5K  6. Patient will increase FOTO score to at least 74 to improve ease with daily and recreational activities.                Eval: 58               Current: Met - 99       ASSESSMENT/RECOMMENDATIONS: Patient presents for last visit under current plan of care. She demonstrates excellent progress with therapy since start of care. She has returned to running races, although paces herself as needed on long distance. She has now completed 2-5K races with good tolerance since start of care. Her strength in glutes, quad, and HS have also improved since start of care. She demonstrates improved form with squats/exercises and is agreeable to self-management of symptoms. Patient with no further questions or concerns at this time.  Thank you for this referral.     [x]Discontinue therapy: [x]Patient has reached or is progressing toward set goals      []Patient is non-compliant or has abdicated      []Due to lack of appreciable progress towards set goals    Denny Miller, PT, DPT 11/29/2021 7:13 AM

## 2021-11-29 NOTE — PROGRESS NOTES
PT DAILY TREATMENT NOTE     Patient Name: Samuel Campuzano  Date:2021  : 1980  [x]  Patient  Verified  Payor: BLUE CROSS / Plan: [facility] PPO / Product Type: PPO /    In time: 700  Out time: 742  Total Treatment Time (min): 42  Visit #: 8 of 8    Medicare/BCBS Only   Total Timed Codes (min):  42 1:1 Treatment Time:  42       Treatment Area: Knee pain, right [M25.561]  Left knee pain [M25.562]    SUBJECTIVE  Pain Level (0-10 scale): 0  Any medication changes, allergies to medications, adverse drug reactions, diagnosis change, or new procedure performed?: [x] No    [] Yes (see summary sheet for update)  Subjective functional status/changes:   [] No changes reported  Patient reports she ran a 5K on Wednesday and reports doing well. She reports the course was a little bit more \"incline\", however overall she did well.      OBJECTIVE    18 min Therapeutic Exercise:  [x] See flow sheet : + reassessment of goals, review of DC planning    Rationale: increase ROM, increase strength and improve coordination to improve the patients ability to perform ADLs and self care tasks with greater ease     10 min Therapeutic Activity:  [x]  See flow sheet : squats with 8kg ball toss, runner jump step up on L2 plyo box    Rationale: increase strength and improve coordination  to improve the patients ability to perform functional tasks with improved ease      14 min Neuromuscular Re-education:  [x]  See flow sheet : SLS on rockerboard with pertubations and rebounder ball toss, single leg squats with tammi palloff press out, bosu lunges     Rationale: increase strength, improve coordination, improve balance and increase proprioception  to improve the patients ability to perform functional tasks with improved stabilization and control         With   [] TE   [] TA   [] neuro   [] other: Patient Education: [x] Review HEP    [] Progressed/Changed HEP based on:   [] positioning   [] body mechanics   [] transfers   [] heat/ice application    [] other:      Other Objective/Functional Measures: right abduction 5/5, right extension 5-/5, left abduction 5/5, left extension 5/5; FOTO 99    Pain Level (0-10 scale) post treatment: 0    ASSESSMENT/Changes in Function: Patient presents for last visit under current plan of care. She demonstrates excellent progress with therapy since start of care. She has returned to running races, although paces herself as needed on long distance. She has now completed 2-5K races with good tolerance since start of care. Her strength in glutes, quad, and HS have also improved since start of care. She demonstrates improved form with squats/exercises and is agreeable to self-management of symptoms. Patient with no further questions or concerns at this time. Thank you for this referral.     []  See Plan of Care  []  See progress note/recertification  [x]  See Discharge Summary         Progress towards goals / Updated goals:  Short Term Goals: To be accomplished in 2 weeks:  1. Patient will report performance of home exercise program 4 of 7 days in the next week demonstrating compliance to therapy program and progress towards independent management of symptoms.             Eval: HEP provided and instructed               WGDPVMI: 11/3/2021 - Progressing - reports initiating at home  [address] be accomplished in 4 weeks:  1. Patient will increase right SLS with ball catch to at least 30 seconds to demonstrate improved stabilization and return to PLOF.             Eval: instability noted on left SLS               Current: progressing 11/23/2021 - on compliant surface, minimal difficulty   2.  Patient will increase bilateral glute med and max strength to at least 5-/5 to improve ease with stair negotiation and transfers with greater ease.               Eval: right abduction 4/5, right extension 4/5, left abduction 4+/5, left extension 4/5    Current: Met 11/29/2021 - right abduction 5/5, right extension 5-/5, left abduction 5/5, left extension 5/5  3. Patient will increase bilateral HS strength to at least 5-/5 to improve ease with running.               Eval: 4+/5 bilaterally               Current: Met 5/5 Bilaterally 11/15/2021  4. Patient will improve HS flexibility to <25 degrees to demonstrate improved ease with daily activities.                Eval: right 45 degrees               Current: met 11/17/2021 - lacking 8 degrees HS   5. Patient will report no difficulty with running 2 miles to promote return to PLOF.             Eval: currently avoiding due to pain              Current: progressing 11/23/2021 - minimal discomfort running 5K  6. Patient will increase FOTO score to at least 74 to improve ease with daily and recreational activities.                Eval: 58    Current: Met 11/29/2021 - 99     PLAN  []  Upgrade activities as tolerated     []  Continue plan of care  []  Update interventions per flow sheet       [x]  Discharge due to: met LTGs   []  Other:_      Lachelle Burns PT, DPT 11/29/2021  7:03 AM    Future Appointments   Date Time Provider Debra Flood   12/1/2021  3:45 PM Mika Goddard [facility]   12/10/2021  9:00 AM [facility]IATRIC DIETITIAN [facility]

## 2021-11-29 NOTE — PROGRESS NOTES
Physical Therapy Discharge Instructions      [facility]   [address]  [place], [address]  [phone] fax    Patient: Gilbert Sadler  : 1980      Continue Home Exercise Program 1 times per day for 2 weeks, then decrease to 3-5 times per week      Continue with    [x] Ice  as needed     [] Heat           Follow up with MD:     [] Upon completion of therapy     [x] As needed      Recommendations:     [x]   Return to activity with home program    []   Return to activity with the following modifications:       []Post Rehab Program    []Join Independent aquatic program     []Return to/join local gym    Denny Miller PT, DPT 2021 7:13 AM

## 2021-12-01 ENCOUNTER — APPOINTMENT (OUTPATIENT)
Dept: PHYSICAL THERAPY | Age: 41
End: 2021-12-01
Attending: ORTHOPAEDIC SURGERY

## 2021-12-10 ENCOUNTER — HOSPITAL ENCOUNTER (OUTPATIENT)
Dept: BARIATRICS/WEIGHT MGMT | Age: 41
Discharge: HOME | End: 2021-12-10

## 2021-12-10 ENCOUNTER — DOCUMENTATION ONLY (OUTPATIENT)
Dept: BARIATRICS/WEIGHT MGMT | Age: 41
End: 2021-12-10

## 2021-12-10 NOTE — PROGRESS NOTES
[address] [facility] [address] [facility], [address]      Patient's Name: Sheree Minaya   Age: 42 y.o. YOB: 1980   Sex: female    Date:   12/10/2021    Height: 5 f  Weight:    143      Lbs. BMI: 27.9     Surgery Date: 3/22/21    Starting Weight: 247   Last Recorded Weight:   Overall Pounds Lost: 104     Procedure: Gastric Bypass    Lowest Weight patient has achieved since surgery: Current    How long has patient been at this weight for: 143-145 for 3 weeks    Other Pertinent Information:     Diet History (reported by patient on diet history form)    Do you smoke: None    Alcohol Intake: None    Meals per day: Patient is eating 3 meals per day. Diet History:  States she eats right when she wakes up. Protein shake (Premier shake). She states she will then have cottage cheese with pineapples or blueberries or sometimes she may have an egg. Breakfast-Lunch:  Tortilla chips. She states she will have a snack size bag of this. 12:30 pm:  Varies. She states sometimes it is shredded BBQ pork. She states she may have a protein bar and oatmeal.  She states she may do [place] and take the top piece of bread off. She states she may get steak or rotisserie chicken on this. She states she may get a 6\" and isn't able to finish it all. Lunch-Dinner:  None  6-7 pm: Taco salad without the shell. She states that is often. She states sometimes she will have chili. She states she will do some source of protein with her dinner and some vegetables. Bedtime:  None. She states once in awhile, she may have a small amount of popcorn. If she is hungry, she may have Oikos yogurt and string cheese. Portion sizes ~: Hand size. She states she still feels restriction if she eats too much. Patient states she eats, gets full, stops, and does not go back to it. Simple sugar intake: Patient states she doesn't eat a lot of these.      Experiencing dumping syndrome: Yes    Carbohydrate intake: Yes, Tortilla chips. Symptoms that occur when carbohydrates are consumed: Yes, states she gets very sleepy    Ounces of fluid consumed per day: Less than 32 ounces per day. She states she got a bottle with times that she is trying to push more. Fluids being consumed: Water    Patient is drinking protein drinks    Patient is snacking on: See above    Exercise History    Patient is doing stretches and she is doing running as she has done several races sine surgery for exercise. Vitamins    Patient states she ran out of her MVI and was out for 8 days. [facility]    Patient is taking Calcium in the form of chewable. Patient is taking 1500 mg per day. Patient is taking 1000 mcg of Vitamin B12. Patient is taking 5000 IU of Vitamin D 3 today. Patient is not taking B1. Summary: Weight loss is at 104 pounds. I have reinforced the key diet principles to the patient. Patient was instructed to follow a 3 meal a day routine. I have reinforced the importance of filling up on meat and vegetables and avoiding carbohydrates. I have talked with patient about reactive hypoglycemia and although carbohydrates are not good from a weight-management point of view, they are actually very dangerous and should be avoided. If patient is getting hungry between meals focus on meat and vegetables and a list of food choices was reviewed with patient. Reinforced to patient the importance of being properly hydrated and the need to get 64 ounces of fluid in per day. Reinforced to patient the need to do 30 minutes of exercise per day. We also spent some time talking about the vitamins and the importance of taking them. Reinforced the dosage of vitamins to patient. Patient was reminded that the vitamins are lifelong. Other Pertinent Information: Overall, patient is doing well. Her weight loss is appropriate. A few goals to work on:    1. We talked about stopping the tortilla chips in between and I made other recommendations to patient for protein-based snacks or vegetables, if she is physically hungry, but overall trying not to eat between meals. 2. We talked about carbohydrates and keeping under 50 grams per day. 3.  Work on increasing fluid intake. Suggestions were made  4. Patient needs to start taking the vitamins (MVI and calcium again). Patient was re-educated on what she needs to be taking. Handouts were provided for staying on track and meal ideas at 9 months post op.   Follow up Randell Jarrell [address]  12/10/2021

## 2022-02-09 ENCOUNTER — OFFICE VISIT (OUTPATIENT)
Dept: ORTHOPEDIC SURGERY | Age: 42
End: 2022-02-09
Payer: COMMERCIAL

## 2022-02-09 VITALS
WEIGHT: 146 LBS | HEIGHT: 60 IN | TEMPERATURE: 97 F | BODY MASS INDEX: 28.66 KG/M2 | HEART RATE: 68 BPM | OXYGEN SATURATION: 99 %

## 2022-02-09 DIAGNOSIS — M17.0 PRIMARY OSTEOARTHRITIS OF BOTH KNEES: Primary | ICD-10-CM

## 2022-02-09 PROCEDURE — 99213 OFFICE O/P EST LOW 20 MIN: CPT | Performed by: ORTHOPAEDIC SURGERY

## 2022-02-09 NOTE — PROGRESS NOTES
Kain Barger  1980   Chief Complaint   Patient presents with    Knee Pain     bilat        HISTORY OF PRESENT ILLNESS  Kain Barger is a 42 y.o. female who presents today for reevaluation of b/l knee pain. Patient rates pain as 0/10 today. At [address] on 10/26/2021, patient had a b/l knee cortisone injection which provided relief for a couple of weeks. She took a mile walk the other day which exasperated the pain. She went to  back in October. She has not been running marathons since October. Had gastric bypass surgery in March. Patient denies any fever, chills, chest pain, shortness of breath or calf pain. The remainder of the review of systems is negative. There are no new illness or injuries to report since last seen in the office. There are no changes to medications, allergies, family or social history. PHYSICAL EXAM:   Visit Vitals  Pulse 68   Temp 97 °F (36.1 °C) (Temporal)   Ht 5' (1.524 m)   Wt 146 lb (66.2 kg)   SpO2 99%   BMI 28.51 kg/m²     The patient is a well-developed, well-nourished female   in no acute distress. The patient is alert and oriented times three. The patient is alert and oriented times three. Mood and affect are normal.  LYMPHATIC: lymph nodes are not enlarged and are within normal limits  SKIN: normal in color and non tender to palpation. There are no bruises or abrasions noted. NEUROLOGICAL: Motor sensory exam is within normal limits. Reflexes are equal bilaterally.  There is normal sensation to pinprick and light touch  MUSCULOSKELETAL:  Examination Left knee Right knee   Skin Intact Intact   Range of motion 0-120 0-120   Effusion + +   Medial joint line tenderness + +   Lateral joint line tenderness - -   Tenderness Pes Bursa - -   Tenderness insertion MCL - -   Tenderness insertion LCL - -   Williss - +   Patella crepitus + +   Patella grind - -   Lachman - -   Pivot shift - -   Anterior drawer - -   Posterior drawer - -   Varus stress - -   Valgus stress - -   Neurovascular Intact Intact   Calf Swelling and Tenderness to Palpation - -   Lucia's Test - -   Hamstring Cord Tightness - -      PROCEDURE: none    IMAGING: MRI of right knee dated 5/21/2021 was reviewed and read by Dr. Amna Chan:   IMPRESSION:  1. No acute osseous findings  2. No discrete meniscal or ligamentous injury  3. Mild tricompartmental osteoarthritic changes as described but no evidence of full thickness cartilage loss. 4. Mild patellar tendinosis  5. Small joint effusion      MRI of left knee dated 10/09/2020 was reviewed and read by Dr. Amna Chan:   IMPRESSION:  1. No discrete meniscal or ligamentous injury. 2. Mild proximal patellar tendinosis  3. Very mild focal subchondral trabecular edema medially in the medial femoral condyle and medial tibial plateau. This is nonspecific and may be reactive or mechanical in nature. 4. Small joint effuson        2 view xray images of bilateral knee taken in office on 5/12/2021 read and reviewed by DIXON Hart, reveal: decreased joint space medial compartment and patella femoral compartment       IMPRESSION:      ICD-10-CM ICD-9-CM    1. Primary osteoarthritis of both knees  M17.0 715.16         PLAN:   1. Pt presents today with b/l knee pain due to MRI-documented degenerative arthritis with joint space narrowing. Due to the lack of lasting relief with cortisone injections, we will proceed with authorization of visco-gel supplementation. Advised her to continue stretching IT band. Return following auth   Risk factors include: BMI>35, recent gastric bypass  2. No ultrasound exam indicated today  3. No cortisone injection indicated today   4. No  Physical/Occupational Therapy indicated today  5. No diagnostic test indicated today:   6. No durable medical equipment indicated today  7. No referral indicated today   8. No medications indicated today:   9.  No Narcotic indicated today       RTC following auth       Scribed by Lyndsey Shaikh[address] [facility] [address]) as dictated by MD PENNY Grover, Dr. Penelope Church, confirm that all documentation is accurate.     Penelope Church M.D.   [facility]

## 2022-02-22 ENCOUNTER — DOCUMENTATION ONLY (OUTPATIENT)
Dept: SURGERY | Age: 42
End: 2022-02-22

## 2022-02-22 NOTE — PROGRESS NOTES
Per [facility] requirements;  E-mail and letter sent for follow up appointment. [facility] [address]      Dear Patient,    Your health is our main concern. It is important for your health to have follow-up lab work and to see your surgeon at 3 months, 6 months and annually after your weight loss surgery. Additionally, the Department of bariatric Surgery at our hospital is a member of the Metabolic and Bariatric Surgery Accreditation and Quality Improvement Program [facility]). As a participant in this program, we gather information on the outcomes of our patients after surgery. Please call the office for a follow up appointment at [phone] [facility]) or [phone] [facility]). If you have moved out of the area or have changed surgeons please call us and let us know the name of your doctor. Your health and feedback are important to us. We greatly appreciate your response.        Thank you,  [facility] [address] [facility]

## 2022-03-16 ENCOUNTER — OFFICE VISIT (OUTPATIENT)
Dept: ORTHOPEDIC SURGERY | Age: 42
End: 2022-03-16
Payer: COMMERCIAL

## 2022-03-16 VITALS — TEMPERATURE: 97 F | OXYGEN SATURATION: 96 % | HEART RATE: 72 BPM

## 2022-03-16 DIAGNOSIS — M17.0 PRIMARY OSTEOARTHRITIS OF BOTH KNEES: Primary | ICD-10-CM

## 2022-03-16 PROCEDURE — 99213 OFFICE O/P EST LOW 20 MIN: CPT | Performed by: PHYSICIAN ASSISTANT

## 2022-03-16 PROCEDURE — 20611 DRAIN/INJ JOINT/BURSA W/US: CPT | Performed by: PHYSICIAN ASSISTANT

## 2022-03-16 RX ORDER — TRIAMCINOLONE ACETONIDE 40 MG/ML
40 INJECTION, SUSPENSION INTRA-ARTICULAR; INTRAMUSCULAR ONCE
Status: COMPLETED | OUTPATIENT
Start: 2022-03-16 | End: 2022-03-16

## 2022-03-16 RX ADMIN — TRIAMCINOLONE ACETONIDE 40 MG: 40 INJECTION, SUSPENSION INTRA-ARTICULAR; INTRAMUSCULAR at 15:38

## 2022-03-16 NOTE — PROGRESS NOTES
Gokul Villalba  1980   Chief Complaint   Patient presents with    Knee Pain     LT        HISTORY OF PRESENT ILLNESS  Gokul Villalba is a 42 y.o. female who presents today for reevaluation of b/l knee pain. Patient rates pain as 2/10 today. She states in the last few weeks her knee pain has worsened. She went for a run and now has sharp stabbing pains. Is in formal physical therapy now and feels some improvement but is requesting repeat injections. Had gastric bypass surgery in March. Patient denies any fever, chills, chest pain, shortness of breath or calf pain. The remainder of the review of systems is negative. There are no new illness or injuries to report since last seen in the office. There are no changes to medications, allergies, family or social history. PHYSICAL EXAM:   Visit Vitals  Pulse 72   Temp 97 °F (36.1 °C)   SpO2 96%     The patient is a well-developed, well-nourished female   in no acute distress. The patient is alert and oriented times three. The patient is alert and oriented times three. Mood and affect are normal.  LYMPHATIC: lymph nodes are not enlarged and are within normal limits  SKIN: normal in color and non tender to palpation. There are no bruises or abrasions noted. NEUROLOGICAL: Motor sensory exam is within normal limits. Reflexes are equal bilaterally.  There is normal sensation to pinprick and light touch  MUSCULOSKELETAL:  Examination Left knee Right knee   Skin Intact Intact   Range of motion 0-120 0-120   Effusion + +   Medial joint line tenderness + +   Lateral joint line tenderness - -   Tenderness Pes Bursa - -   Tenderness insertion MCL - -   Tenderness insertion LCL - -   Williss - +   Patella crepitus + +   Patella grind - -   Lachman - -   Pivot shift - -   Anterior drawer - -   Posterior drawer - -   Varus stress - -   Valgus stress - -   Neurovascular Intact Intact   Calf Swelling and Tenderness to Palpation - -   Lucia's Test - -   Hamstring Cord Tightness - -      PROCEDURE: Bilateral knee Injection with Ultrasound Guidance    Indication:Bilateral Knee pain/swelling    After sterile prep, 4 cc of Xylocaine and 1 cc of Kenalog were injected into the bilateral  Knee. Intra-articular Ultrasound images captured using [address] Ultrasound machine using a frequency of 10 MHz with a linear transducer and scanned into patient's chart. [facility]  OFFICE PROCEDURE PROGRESS NOTE        Chart reviewed for the following:  Azar FRANCIS PA, have reviewed the History, Physical and updated the Allergic reactions for Elizabeth Brewstera performed immediately prior to start of procedure:  Azar FRANCIS PA-C, have performed the following reviews on Shreyas Martinez prior to the start of the procedure:            * Patient was identified by name and date of birth   * Agreement on procedure being performed was verified  * Risks and Benefits explained to the patient  * Procedure site verified and marked as necessary  * Patient was positioned for comfort  * Consent was signed and verified     Time: 3:31 PM    Date of procedure: 3/16/2022    Procedure performed by:  DIXON Serrato    Provider assisted by: (see medication administration)    How tolerated by patient: tolerated the procedure well with no complications    Comments: none      IMAGING: MRI of right knee dated 5/21/2021 was reviewed and read by Dr. Bo Nolasco:   IMPRESSION:  1. No acute osseous findings  2. No discrete meniscal or ligamentous injury  3. Mild tricompartmental osteoarthritic changes as described but no evidence of full thickness cartilage loss. 4. Mild patellar tendinosis  5. Small joint effusion      MRI of left knee dated 10/09/2020 was reviewed and read by Dr. Bo Nolasco:   IMPRESSION:  1. No discrete meniscal or ligamentous injury. 2. Mild proximal patellar tendinosis  3.  Very mild focal subchondral trabecular edema medially in the medial femoral condyle and medial tibial plateau. This is nonspecific and may be reactive or mechanical in nature. 4. Small joint effuson        2 view xray images of bilateral knee taken in office on 5/12/2021 read and reviewed by DIXON Carmichael, reveal: decreased joint space medial compartment and patella femoral compartment       IMPRESSION:      ICD-10-CM ICD-9-CM    1. Primary osteoarthritis of both knees  M17.0 715.16 ARTHROCENTESIS ASPIR&/INJ MAJOR JT/BURSA W/US      triamcinolone acetonide (KENALOG-40) 40 mg/mL injection 40 mg        PLAN:   1. Pt presents today with b/l knee pain due to MRI-documented degenerative arthritis with joint space narrowing presenting today with acute exacerbation. Will inject with cortisone today  Risk factors include: BMI>35, recent gastric bypass  2. No ultrasound exam indicated today  3. Yes cortisone injection indicated today   4. No  Physical/Occupational Therapy indicated today  5. No diagnostic test indicated today:   6. No durable medical equipment indicated today  7. No referral indicated today   8. No medications indicated today:   9.  No Narcotic indicated today       RTC 4 weeks if pain persists    [facility]

## 2022-03-19 PROBLEM — I10 ESSENTIAL HYPERTENSION: Status: ACTIVE | Noted: 2020-09-21

## 2022-03-19 PROBLEM — E66.01 MORBID OBESITY (HCC): Status: ACTIVE | Noted: 2020-09-21

## 2022-12-20 NOTE — PATIENT INSTRUCTIONS
Take the following medications as noted. - If no dori by the medication, take as prescribed until the midnight prior to surgery. - If the medication is circled, take with a sip of water on the morning of surgery prior to reporting to the hospital  - If the medication has a line drawn through it, do not take that medication after starting your liquid diet before surgery  - If the medication has a star beside it, change its dosing as written beside it on the date written beside it. Current Outpatient Medications   Medication Sig Dispense Refill    ferrous sulfate 325 mg (65 mg iron) tablet Take 1 Tab by mouth Daily (before breakfast). 90 Tab 1    ferrous sulfate 325 mg (65 mg iron) tablet Take 325 mg by mouth daily.  hydroCHLOROthiazide (MICROZIDE) 12.5 mg capsule       prenatal vit-calcium-iron-fa (Prenatal Plus, calcium carb,) 27 mg iron- 1 mg tab Take 1 Tab by mouth daily.  calcium-cholecalciferol, d3, (CALCIUM 600 + D) 600-125 mg-unit tab Take  by mouth.  levothyroxine (SYNTHROID) 112 mcg tablet Take  by mouth Daily (before breakfast).
Yes

## 2023-02-28 ENCOUNTER — OFFICE VISIT (OUTPATIENT)
Age: 43
End: 2023-02-28
Payer: COMMERCIAL

## 2023-02-28 ENCOUNTER — TELEPHONE (OUTPATIENT)
Age: 43
End: 2023-02-28

## 2023-02-28 VITALS — WEIGHT: 146 LBS | TEMPERATURE: 97.8 F | BODY MASS INDEX: 28.66 KG/M2 | HEIGHT: 60 IN

## 2023-02-28 DIAGNOSIS — M17.12 PRIMARY OSTEOARTHRITIS OF LEFT KNEE: Primary | ICD-10-CM

## 2023-02-28 DIAGNOSIS — M17.11 PRIMARY OSTEOARTHRITIS OF RIGHT KNEE: ICD-10-CM

## 2023-02-28 PROCEDURE — 99213 OFFICE O/P EST LOW 20 MIN: CPT | Performed by: ORTHOPAEDIC SURGERY

## 2023-02-28 PROCEDURE — 73560 X-RAY EXAM OF KNEE 1 OR 2: CPT | Performed by: ORTHOPAEDIC SURGERY

## 2023-02-28 NOTE — PROGRESS NOTES
Cecille Bowen  1980   Chief Complaint   Patient presents with    Knee Pain     rufina        HISTORY OF PRESENT ILLNESS  Cecille Bowen is a 36 y.o. female who presents today for reevaluation of b/l knee pain. Pain is a 2/10. Was last seen in the office by VANESSA Sotelo on 3/16/2022 at which time she received b/l knee cortisone injections. Injections only lasted around 1 month. Her knee pain has been worsening. Has pain with steps, squatting, lunges. Pain is centrally located around the patella. Also notes crepitus and recently has had trouble with straightening the leg. She also notices \"bumps\" in her knee when working out, they feel like marbles. She states she has [place] for her insurance which has previously denied coverage for visco-supplementation injections. Patient denies any fever, chills, chest pain, shortness of breath or calf pain. The remainder of the review of systems is negative. There are no new illness or injuries to report since last seen in the office. No changes in medications, allergies, social or family history. PHYSICAL EXAM:   Temp 97.8 °F (36.6 °C) (Temporal)   Ht 5' (1.524 m)   Wt 146 lb (66.2 kg)   BMI 28.51 kg/m²   The patient is a well-developed, well-nourished female   in no acute distress. The patient is alert and oriented times three. The patient is alert and oriented times three. Mood and affect are normal.  LYMPHATIC: lymph nodes are not enlarged and are within normal limits  SKIN: normal in color and non tender to palpation. There are no bruises or abrasions noted. NEUROLOGICAL: Motor sensory exam is within normal limits. Reflexes are equal bilaterally.  There is normal sensation to pinprick and light touch  MUSCULOSKELETAL:   Examination  Left knee  Right knee     Skin  Intact  Intact     Range of motion  0-120  0-120         Effusion  +  +         Medial joint line tenderness  +  +     Lateral joint line tenderness  -  -         Tenderness Pes Bursa  -  - Tenderness insertion MCL  -  -     Tenderness insertion LCL  -  -     gA's  -  +         Patella crepitus  +  +     Patella grind  -  -     Lachman  -  -     Pivot shift  -  -     Anterior drawer  -  -     Posterior drawer  -  -     Varus stress  -  -     Valgus stress  -  -         Neurovascular  Intact  Intact         Calf Swelling and Tenderness to Palpation  -  -     Tamela's Test  -  -     Hamstring Cord Tightness  -  -           IMAGING: XR of the right knee with 2 views obtained in the office dated 2/28/2023 reviewed and read by Dr. Imani Mora: Moderate degenerative changes in the patellofemoral joint bilaterally. Decreased joint space in the medial compartment, right worse than left. XR of the left knee with 2 views obtained in the office dated 2/28/2023 reviewed and read by Dr. Imnai Mora: Moderate degenerative changes in the patellofemoral joint bilaterally. Decreased joint space in the medial compartment, right worse than left. MRI of right knee dated 5/21/2021 was reviewed and read  by Dr. Imani Mora:    IMPRESSION:   1. No acute osseous findings   2. No discrete meniscal or ligamentous injury   3. Mild tricompartmental osteoarthritic changes as described but no evidence of full thickness cartilage loss. 4. Mild patellar tendinosis   5. Small joint effusion         MRI of left knee dated 10/09/2020 was reviewed and read by Dr. Imani Mora:    IMPRESSION:   1. No discrete meniscal or ligamentous injury. 2. Mild proximal patellar tendinosis   3. Very mild focal subchondral trabecular edema medially in the medial femoral condyle and medial tibial plateau. This is nonspecific and may be reactive or mechanical in nature. 4. Small joint effuson         2 view xray images of bilateral knee taken in office on 5/12/2021 read and reviewed by VANESSA Phelps, reveal: decreased joint space medial compartment and patella femoral compartment     IMPRESSION:      ICD-10-CM    1.  Primary osteoarthritis of left knee  M17.12 [69552] Knee 1-2V     [14842] Knee 1-2V     Ambulatory referral to Physical Therapy      2. Primary osteoarthritis of right knee  M17.11 [06130] Knee 1-2V     [88645] Knee 1-2V     Ambulatory referral to Physical Therapy           PLAN:   1. Pt presents with b/l knee pain due to primary OA and has failed previous cortisone injections. Will try to get Euflexxa samples for both knees. Will also refer to PT for hamstring stretching and eccentric loading on the patellofemoral joint. She is not a candidate for NSAIDs due to history of gastric bypass surgery.  Risk factors include: no NSAIDs secondary to hx of gastric bypass surgery  2. No cortisone injection indicated today   3. Yes Physical/Occupational Therapy indicated today  4. No diagnostic test indicated today:   5. No durable medical equipment indicated today  6. No referral indicated today   7. No medications indicated today:   8. No Narcotic indicated today      RTC for Euflexxa injections once samples can be obtained      Scribed by Stacia Acosta ([place]ick) as dictated by Marky Nunez MD    I, Dr. Marky Nunez, confirm that all documentation is accurate.    Marky Nunez M.D.   [facility]

## 2023-03-01 NOTE — TELEPHONE ENCOUNTER
We currently have none. See if there are samples from another provider we can have. Do not care on what brand.

## 2023-04-04 ENCOUNTER — TELEPHONE (OUTPATIENT)
Age: 43
End: 2023-04-04

## 2023-04-14 ENCOUNTER — HOSPITAL ENCOUNTER (OUTPATIENT)
Facility: HOSPITAL | Age: 43
Discharge: HOME | End: 2023-04-17

## (undated) DEVICE — REM POLYHESIVE ADULT PATIENT RETURN ELECTRODE: Brand: VALLEYLAB

## (undated) DEVICE — TRUE CONTENT TO BE POPULATED AS PART OF REBRANDING: Brand: ARGYLE

## (undated) DEVICE — TROCAR ENDOSCP L100MM DIA12MM STBL SL BLDELSS ENDOPATH XCEL

## (undated) DEVICE — SCISSORS ENDOSCP DIA5MM CRV MPLR CAUT W/ RATCH HNDL

## (undated) DEVICE — TRAY,URINE METER,100% SILICONE,16FR10ML: Brand: MEDLINE

## (undated) DEVICE — RELOAD STPL L60MM H1-2.6MM MESENTERY THN TISS WHT 6 ROW

## (undated) DEVICE — SOL IRR SOD CL 0.9% 1000ML BTL --

## (undated) DEVICE — STAPLER SKIN L440MM 32MM LNG 12 FIRING B FRM PWR + GRIPPING

## (undated) DEVICE — SET SUCT IRR TIP DISP STRYKEFLOW2

## (undated) DEVICE — SUT SLK 2-0SH 30IN BLK --

## (undated) DEVICE — SLEEVE TRCR L100MM DIA5MM UNIV STBL FOR BLDELSS DIL TIP

## (undated) DEVICE — SUTURE VCRL SZ 3-0 L27IN ABSRB VLT L26MM SH 1/2 CIR J316H

## (undated) DEVICE — VISUALIZATION SYSTEM: Brand: CLEARIFY

## (undated) DEVICE — RELOAD STPL L60MM H1.5-3.6MM REG TISS BLU GRIPPING SURF B

## (undated) DEVICE — ADHESIVE SKN CLSR HI VISC 2-O --

## (undated) DEVICE — BLANKET WRM AD W50XL85.8IN PACU FULL BODY FORC AIR

## (undated) DEVICE — Device

## (undated) DEVICE — GARMENT,MEDLINE,DVT,INT,CALF,LG, GEN2: Brand: MEDLINE

## (undated) DEVICE — 4-PORT MANIFOLD: Brand: NEPTUNE 2

## (undated) DEVICE — GLOVE SURG SZ 7.5 L11.73IN FNGR THK7.5MIL STRW LTX POLYMER

## (undated) DEVICE — INTENDED FOR TISSUE SEPARATION, AND OTHER PROCEDURES THAT REQUIRE A SHARP SURGICAL BLADE TO PUNCTURE OR CUT.: Brand: BARD-PARKER SAFETY BLADES SIZE 15, STERILE

## (undated) DEVICE — AIRLIFE™ ADULT CUSHION NASAL CANNULA 14 FOOT (4.3) CRUSH-RESISTANT OXYGEN TUBING, AND U/CONNECT-IT ADAPTER: Brand: AIRLIFE™

## (undated) DEVICE — TROCAR ENDOSCP SHFT L100MM DIA12MM INTEGR STBL ENDOPATH

## (undated) DEVICE — TROCAR LAP L100MM DIA5MM BLDELSS W/ STBL SL ENDOPATH XCEL

## (undated) DEVICE — SHEARS ENDOSCP L36CM DIA5MM ULTRASONIC CRV TIP W/ ADV

## (undated) DEVICE — SUTURE MCRYL SZ 4-0 L27IN ABSRB UD L24MM PS-1 3/8 CIR PRIM Y935H

## (undated) DEVICE — BLANKET WRM W29.9XL79.1IN UP BODY FORC AIR MISTRAL-AIR

## (undated) DEVICE — SUTURE VCRL SZ 2-0 L54IN ABSRB VLT W/O NDL POLYGLACTIN 910 J618H

## (undated) DEVICE — PACK PROCEDURE SURG LAPAROSCOPY 17X7 MM BRTRC PRIMUS

## (undated) DEVICE — SOFT SILICONE HYDROCELLULAR SACRUM DRESSING WITH LOCK AWAY LAYER: Brand: ALLEVYN LIFE SACRUM (LARGE) PACK OF 10